# Patient Record
Sex: MALE | Race: WHITE | NOT HISPANIC OR LATINO
[De-identification: names, ages, dates, MRNs, and addresses within clinical notes are randomized per-mention and may not be internally consistent; named-entity substitution may affect disease eponyms.]

---

## 2017-03-10 ENCOUNTER — APPOINTMENT (OUTPATIENT)
Dept: HEMATOLOGY ONCOLOGY | Facility: CLINIC | Age: 67
End: 2017-03-10

## 2017-03-10 VITALS
TEMPERATURE: 97.5 F | HEIGHT: 71 IN | RESPIRATION RATE: 14 BRPM | BODY MASS INDEX: 35.56 KG/M2 | WEIGHT: 254 LBS | DIASTOLIC BLOOD PRESSURE: 85 MMHG | HEART RATE: 79 BPM | SYSTOLIC BLOOD PRESSURE: 111 MMHG

## 2017-03-10 LAB
BASOPHILS # BLD: 0.06 TH/MM3
BASOPHILS NFR BLD: 0.8 %
EOSINOPHIL # BLD: 0.15 TH/MM3
EOSINOPHIL NFR BLD: 1.9 %
ERYTHROCYTE [DISTWIDTH] IN BLOOD BY AUTOMATED COUNT: 12.9 %
GRANULOCYTES # BLD: 3.63 TH/MM3
GRANULOCYTES NFR BLD: 46.8 %
HCT VFR BLD AUTO: 45.9 %
HGB BLD-MCNC: 17.2 G/DL
IMM GRANULOCYTES # BLD: 0.04 TH/MM3
IMM GRANULOCYTES NFR BLD: 0.5 %
LYMPHOCYTES # BLD: 2.9 TH/MM3
LYMPHOCYTES NFR BLD: 37.3 %
MCH RBC QN AUTO: 32.6 PG
MCHC RBC AUTO-ENTMCNC: 37.5 G/DL
MCV RBC AUTO: 87.1 FL
MONOCYTES # BLD: 0.99 TH/MM3
MONOCYTES NFR BLD: 12.7 %
PLATELET # BLD: 70 TH/MM3
PMV BLD AUTO: 10.1 FL
RBC # BLD AUTO: 5.27 MIL/MM3
WBC # BLD: 7.77 TH/MM3

## 2017-04-05 ENCOUNTER — APPOINTMENT (OUTPATIENT)
Dept: HEMATOLOGY ONCOLOGY | Facility: CLINIC | Age: 67
End: 2017-04-05

## 2017-04-05 LAB
BASOPHILS # BLD: 0.06 TH/MM3
BASOPHILS NFR BLD: 0.8 %
EOSINOPHIL # BLD: 0.13 TH/MM3
EOSINOPHIL NFR BLD: 1.8 %
ERYTHROCYTE [DISTWIDTH] IN BLOOD BY AUTOMATED COUNT: 12.7 %
GRANULOCYTES # BLD: 3.78 TH/MM3
GRANULOCYTES NFR BLD: 53.2 %
HCT VFR BLD AUTO: 44.8 %
HGB BLD-MCNC: 16.1 G/DL
IMM GRANULOCYTES # BLD: 0.03 TH/MM3
IMM GRANULOCYTES NFR BLD: 0.4 %
LYMPHOCYTES # BLD: 2.53 TH/MM3
LYMPHOCYTES NFR BLD: 35.6 %
MCH RBC QN AUTO: 31.6 PG
MCHC RBC AUTO-ENTMCNC: 35.9 G/DL
MCV RBC AUTO: 88 FL
MONOCYTES # BLD: 0.58 TH/MM3
MONOCYTES NFR BLD: 8.2 %
PLATELET # BLD: 128 TH/MM3
PMV BLD AUTO: 10.6 FL
RBC # BLD AUTO: 5.09 MIL/MM3
WBC # BLD: 7.11 TH/MM3

## 2017-04-14 ENCOUNTER — APPOINTMENT (OUTPATIENT)
Dept: HEMATOLOGY ONCOLOGY | Facility: CLINIC | Age: 67
End: 2017-04-14

## 2017-04-14 VITALS
SYSTOLIC BLOOD PRESSURE: 128 MMHG | HEIGHT: 71 IN | BODY MASS INDEX: 35.84 KG/M2 | DIASTOLIC BLOOD PRESSURE: 74 MMHG | RESPIRATION RATE: 14 BRPM | WEIGHT: 256 LBS | TEMPERATURE: 96.9 F | HEART RATE: 72 BPM

## 2017-04-14 LAB
BASOPHILS # BLD: 0.05 TH/MM3
BASOPHILS NFR BLD: 0.6 %
EOSINOPHIL # BLD: 0.21 TH/MM3
EOSINOPHIL NFR BLD: 2.4 %
ERYTHROCYTE [DISTWIDTH] IN BLOOD BY AUTOMATED COUNT: 12.9 %
GRANULOCYTES # BLD: 4.11 TH/MM3
GRANULOCYTES NFR BLD: 47.7 %
HCT VFR BLD AUTO: 45.8 %
HGB BLD-MCNC: 16.4 G/DL
IMM GRANULOCYTES # BLD: 0.05 TH/MM3
IMM GRANULOCYTES NFR BLD: 0.6 %
LYMPHOCYTES # BLD: 3.28 TH/MM3
LYMPHOCYTES NFR BLD: 38.1 %
MCH RBC QN AUTO: 31.8 PG
MCHC RBC AUTO-ENTMCNC: 35.8 G/DL
MCV RBC AUTO: 88.8 FL
MONOCYTES # BLD: 0.91 TH/MM3
MONOCYTES NFR BLD: 10.6 %
PLATELET # BLD: 83 TH/MM3
PMV BLD AUTO: 10.9 FL
RBC # BLD AUTO: 5.16 MIL/MM3
WBC # BLD: 8.61 TH/MM3

## 2017-05-16 ENCOUNTER — APPOINTMENT (OUTPATIENT)
Dept: HEMATOLOGY ONCOLOGY | Facility: CLINIC | Age: 67
End: 2017-05-16

## 2017-05-16 LAB
BASOPHILS # BLD: 0.07 TH/MM3
BASOPHILS NFR BLD: 0.9 %
EOSINOPHIL # BLD: 0.13 TH/MM3
EOSINOPHIL NFR BLD: 1.7 %
ERYTHROCYTE [DISTWIDTH] IN BLOOD BY AUTOMATED COUNT: 12.6 %
GRANULOCYTES # BLD: 3.39 TH/MM3
GRANULOCYTES NFR BLD: 43.9 %
HCT VFR BLD AUTO: 46.8 %
HGB BLD-MCNC: 16.8 G/DL
IMM GRANULOCYTES # BLD: 0.05 TH/MM3
IMM GRANULOCYTES NFR BLD: 0.6 %
LYMPHOCYTES # BLD: 3.03 TH/MM3
LYMPHOCYTES NFR BLD: 39.2 %
MCH RBC QN AUTO: 31.5 PG
MCHC RBC AUTO-ENTMCNC: 35.9 G/DL
MCV RBC AUTO: 87.6 FL
MONOCYTES # BLD: 1.06 TH/MM3
MONOCYTES NFR BLD: 13.7 %
PLATELET # BLD: 139 TH/MM3
PMV BLD AUTO: 10.3 FL
RBC # BLD AUTO: 5.34 MIL/MM3
WBC # BLD: 7.73 TH/MM3

## 2017-06-09 ENCOUNTER — APPOINTMENT (OUTPATIENT)
Dept: HEMATOLOGY ONCOLOGY | Facility: CLINIC | Age: 67
End: 2017-06-09

## 2017-06-09 VITALS
SYSTOLIC BLOOD PRESSURE: 114 MMHG | DIASTOLIC BLOOD PRESSURE: 72 MMHG | RESPIRATION RATE: 14 BRPM | TEMPERATURE: 97.8 F | WEIGHT: 250 LBS | HEIGHT: 71 IN | BODY MASS INDEX: 35 KG/M2 | HEART RATE: 85 BPM

## 2017-06-13 LAB
BASOPHILS # BLD: 0.02 TH/MM3
BASOPHILS NFR BLD: 0.3 %
EOSINOPHIL # BLD: 0.13 TH/MM3
EOSINOPHIL NFR BLD: 1.7 %
ERYTHROCYTE [DISTWIDTH] IN BLOOD BY AUTOMATED COUNT: 12.9 %
GRANULOCYTES # BLD: 4.34 TH/MM3
GRANULOCYTES NFR BLD: 55.3 %
HCT VFR BLD AUTO: 44.4 %
HGB BLD-MCNC: 16.1 G/DL
IMM GRANULOCYTES # BLD: 0.04 TH/MM3
IMM GRANULOCYTES NFR BLD: 0.5 %
LYMPHOCYTES # BLD: 2.53 TH/MM3
LYMPHOCYTES NFR BLD: 32.3 %
MCH RBC QN AUTO: 31.8 PG
MCHC RBC AUTO-ENTMCNC: 36.3 G/DL
MCV RBC AUTO: 87.6 FL
MONOCYTES # BLD: 0.78 TH/MM3
MONOCYTES NFR BLD: 9.9 %
PLATELET # BLD: 146 TH/MM3
PMV BLD AUTO: 10.1 FL
RBC # BLD AUTO: 5.07 MIL/MM3
WBC # BLD: 7.84 TH/MM3

## 2017-08-04 ENCOUNTER — OUTPATIENT (OUTPATIENT)
Dept: OUTPATIENT SERVICES | Facility: HOSPITAL | Age: 67
LOS: 1 days | Discharge: HOME | End: 2017-08-04

## 2017-08-04 ENCOUNTER — APPOINTMENT (OUTPATIENT)
Dept: HEMATOLOGY ONCOLOGY | Facility: CLINIC | Age: 67
End: 2017-08-04

## 2017-08-04 VITALS
DIASTOLIC BLOOD PRESSURE: 83 MMHG | BODY MASS INDEX: 35.56 KG/M2 | HEART RATE: 76 BPM | WEIGHT: 254 LBS | SYSTOLIC BLOOD PRESSURE: 121 MMHG | HEIGHT: 71 IN | TEMPERATURE: 97.9 F

## 2017-08-04 DIAGNOSIS — D69.6 THROMBOCYTOPENIA, UNSPECIFIED: ICD-10-CM

## 2017-08-04 DIAGNOSIS — D69.3 IMMUNE THROMBOCYTOPENIC PURPURA: ICD-10-CM

## 2017-08-04 LAB
BASOPHILS # BLD: 0.05 TH/MM3
BASOPHILS NFR BLD: 0.6 %
EOSINOPHIL # BLD: 0.12 TH/MM3
EOSINOPHIL NFR BLD: 1.5 %
ERYTHROCYTE [DISTWIDTH] IN BLOOD BY AUTOMATED COUNT: 12.9 %
GRANULOCYTES # BLD: 4.55 TH/MM3
GRANULOCYTES NFR BLD: 56.4 %
HCT VFR BLD AUTO: 45.8 %
HGB BLD-MCNC: 16.5 G/DL
IMM GRANULOCYTES # BLD: 0.08 TH/MM3
IMM GRANULOCYTES NFR BLD: 1 %
LYMPHOCYTES # BLD: 2.25 TH/MM3
LYMPHOCYTES NFR BLD: 27.8 %
MCH RBC QN AUTO: 31.3 PG
MCHC RBC AUTO-ENTMCNC: 36 G/DL
MCV RBC AUTO: 86.9 FL
MONOCYTES # BLD: 1.03 TH/MM3
MONOCYTES NFR BLD: 12.7 %
PLATELET # BLD: 111 TH/MM3
PMV BLD AUTO: 11.2 FL
RBC # BLD AUTO: 5.27 MIL/MM3
WBC # BLD: 8.08 TH/MM3

## 2017-09-19 ENCOUNTER — APPOINTMENT (OUTPATIENT)
Dept: HEMATOLOGY ONCOLOGY | Facility: CLINIC | Age: 67
End: 2017-09-19

## 2017-09-22 ENCOUNTER — APPOINTMENT (OUTPATIENT)
Dept: HEMATOLOGY ONCOLOGY | Facility: CLINIC | Age: 67
End: 2017-09-22

## 2017-09-22 ENCOUNTER — OUTPATIENT (OUTPATIENT)
Dept: OUTPATIENT SERVICES | Facility: HOSPITAL | Age: 67
LOS: 1 days | Discharge: HOME | End: 2017-09-22

## 2017-09-22 VITALS
BODY MASS INDEX: 35.42 KG/M2 | HEART RATE: 86 BPM | DIASTOLIC BLOOD PRESSURE: 70 MMHG | WEIGHT: 253 LBS | TEMPERATURE: 98 F | SYSTOLIC BLOOD PRESSURE: 121 MMHG | RESPIRATION RATE: 14 BRPM | HEIGHT: 71 IN

## 2017-09-22 DIAGNOSIS — R79.89 OTHER SPECIFIED ABNORMAL FINDINGS OF BLOOD CHEMISTRY: ICD-10-CM

## 2017-09-22 LAB
BASOPHILS # BLD: 0.02 TH/MM3
BASOPHILS NFR BLD: 0.4 %
EOSINOPHIL # BLD: 0.1 TH/MM3
EOSINOPHIL NFR BLD: 1.8 %
ERYTHROCYTE [DISTWIDTH] IN BLOOD BY AUTOMATED COUNT: 13 %
FERRITIN SERPL-MCNC: 641 NG/ML
GRANULOCYTES # BLD: 2.68 TH/MM3
GRANULOCYTES NFR BLD: 48.6 %
HCT VFR BLD AUTO: 45.7 %
HGB BLD-MCNC: 16.3 G/DL
IMM GRANULOCYTES # BLD: 0.02 TH/MM3
IMM GRANULOCYTES NFR BLD: 0.4 %
IRON SERPL-MCNC: 90 UG/DL
LYMPHOCYTES # BLD: 2.16 TH/MM3
LYMPHOCYTES NFR BLD: 39.2 %
MCH RBC QN AUTO: 31.5 PG
MCHC RBC AUTO-ENTMCNC: 35.7 G/DL
MCV RBC AUTO: 88.4 FL
MONOCYTES # BLD: 0.53 TH/MM3
MONOCYTES NFR BLD: 9.6 %
PERCENT SATURATION (NORTH): 29.3 %
PLATELET # BLD: 206 TH/MM3
PMV BLD AUTO: 8.9 FL
RBC # BLD AUTO: 5.17 MIL/MM3
WBC # BLD: 5.51 TH/MM3

## 2017-09-23 ENCOUNTER — OUTPATIENT (OUTPATIENT)
Dept: OUTPATIENT SERVICES | Facility: HOSPITAL | Age: 67
LOS: 1 days | Discharge: HOME | End: 2017-09-23

## 2017-09-23 DIAGNOSIS — R79.89 OTHER SPECIFIED ABNORMAL FINDINGS OF BLOOD CHEMISTRY: ICD-10-CM

## 2017-09-26 DIAGNOSIS — D69.3 IMMUNE THROMBOCYTOPENIC PURPURA: ICD-10-CM

## 2017-09-26 DIAGNOSIS — R79.89 OTHER SPECIFIED ABNORMAL FINDINGS OF BLOOD CHEMISTRY: ICD-10-CM

## 2017-11-12 ENCOUNTER — EMERGENCY (EMERGENCY)
Facility: HOSPITAL | Age: 67
LOS: 0 days | Discharge: HOME | End: 2017-11-12
Admitting: FAMILY MEDICINE

## 2017-11-12 DIAGNOSIS — Z91.040 LATEX ALLERGY STATUS: ICD-10-CM

## 2017-11-12 DIAGNOSIS — M54.5 LOW BACK PAIN: ICD-10-CM
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
## 2017-12-20 ENCOUNTER — APPOINTMENT (OUTPATIENT)
Dept: HEMATOLOGY ONCOLOGY | Facility: CLINIC | Age: 67
End: 2017-12-20

## 2017-12-20 ENCOUNTER — OUTPATIENT (OUTPATIENT)
Dept: OUTPATIENT SERVICES | Facility: HOSPITAL | Age: 67
LOS: 1 days | Discharge: HOME | End: 2017-12-20

## 2017-12-20 VITALS
HEART RATE: 88 BPM | BODY MASS INDEX: 32.2 KG/M2 | DIASTOLIC BLOOD PRESSURE: 76 MMHG | WEIGHT: 230 LBS | RESPIRATION RATE: 14 BRPM | SYSTOLIC BLOOD PRESSURE: 146 MMHG | TEMPERATURE: 97.3 F | HEIGHT: 71 IN

## 2017-12-21 DIAGNOSIS — D69.6 THROMBOCYTOPENIA, UNSPECIFIED: ICD-10-CM

## 2017-12-21 LAB
BASOPHILS # BLD: 0.05 TH/MM3
BASOPHILS NFR BLD: 0.7 %
EOSINOPHIL # BLD: 0.15 TH/MM3
EOSINOPHIL NFR BLD: 2 %
ERYTHROCYTE [DISTWIDTH] IN BLOOD BY AUTOMATED COUNT: 12.6 %
GRANULOCYTES # BLD: 3.85 TH/MM3
GRANULOCYTES NFR BLD: 51.2 %
HCT VFR BLD AUTO: 46 %
HGB BLD-MCNC: 16.5 G/DL
IMM GRANULOCYTES # BLD: 0.09 TH/MM3
IMM GRANULOCYTES NFR BLD: 1.2 %
LYMPHOCYTES # BLD: 2.54 TH/MM3
LYMPHOCYTES NFR BLD: 33.8 %
MCH RBC QN AUTO: 31.7 PG
MCHC RBC AUTO-ENTMCNC: 35.9 G/DL
MCV RBC AUTO: 88.5 FL
MONOCYTES # BLD: 0.83 TH/MM3
MONOCYTES NFR BLD: 11.1 %
PLATELET # BLD: 125 TH/MM3
PMV BLD AUTO: 10.7 FL
RBC # BLD AUTO: 5.2 MIL/MM3
WBC # BLD: 7.51 TH/MM3

## 2018-01-14 ENCOUNTER — TRANSCRIPTION ENCOUNTER (OUTPATIENT)
Age: 68
End: 2018-01-14

## 2018-03-20 ENCOUNTER — LABORATORY RESULT (OUTPATIENT)
Age: 68
End: 2018-03-20

## 2018-03-20 ENCOUNTER — APPOINTMENT (OUTPATIENT)
Dept: HEMATOLOGY ONCOLOGY | Facility: CLINIC | Age: 68
End: 2018-03-20

## 2018-03-20 VITALS
DIASTOLIC BLOOD PRESSURE: 68 MMHG | TEMPERATURE: 97.5 F | RESPIRATION RATE: 14 BRPM | WEIGHT: 234 LBS | BODY MASS INDEX: 32.76 KG/M2 | HEIGHT: 71 IN | SYSTOLIC BLOOD PRESSURE: 117 MMHG | HEART RATE: 65 BPM

## 2018-03-26 LAB
HCT VFR BLD CALC: 44.6 %
HGB BLD-MCNC: 15.6 G/DL
MCHC RBC-ENTMCNC: 31.5 PG
MCHC RBC-ENTMCNC: 35 G/DL
MCV RBC AUTO: 90.1 FL
PLATELET # BLD AUTO: 116 K/UL
PMV BLD: 10.5 FL
RBC # BLD: 4.95 M/UL
RBC # FLD: 12.2 %
WBC # FLD AUTO: 5.95 K/UL

## 2018-04-25 ENCOUNTER — LABORATORY RESULT (OUTPATIENT)
Age: 68
End: 2018-04-25

## 2018-04-25 ENCOUNTER — APPOINTMENT (OUTPATIENT)
Dept: HEMATOLOGY ONCOLOGY | Facility: CLINIC | Age: 68
End: 2018-04-25

## 2018-04-26 LAB
HCT VFR BLD CALC: 44.7 %
HGB BLD-MCNC: 15.7 G/DL
MCHC RBC-ENTMCNC: 31.2 PG
MCHC RBC-ENTMCNC: 35.1 G/DL
MCV RBC AUTO: 88.9 FL
PLATELET # BLD AUTO: 53 K/UL
PMV BLD: 11.6 FL
RBC # BLD: 5.03 M/UL
RBC # FLD: 12.2 %
WBC # FLD AUTO: 6.65 K/UL

## 2018-05-03 ENCOUNTER — LABORATORY RESULT (OUTPATIENT)
Age: 68
End: 2018-05-03

## 2018-05-03 ENCOUNTER — APPOINTMENT (OUTPATIENT)
Dept: HEMATOLOGY ONCOLOGY | Facility: CLINIC | Age: 68
End: 2018-05-03

## 2018-05-07 LAB
HCT VFR BLD CALC: 46.3 %
HGB BLD-MCNC: 16.4 G/DL
MCHC RBC-ENTMCNC: 31.3 PG
MCHC RBC-ENTMCNC: 35.4 G/DL
MCV RBC AUTO: 88.4 FL
PLATELET # BLD AUTO: 102 K/UL
PMV BLD: 10.8 FL
RBC # BLD: 5.24 M/UL
RBC # FLD: 12.2 %
WBC # FLD AUTO: 6.48 K/UL

## 2018-05-22 ENCOUNTER — APPOINTMENT (OUTPATIENT)
Dept: HEMATOLOGY ONCOLOGY | Facility: CLINIC | Age: 68
End: 2018-05-22

## 2018-05-22 ENCOUNTER — LABORATORY RESULT (OUTPATIENT)
Age: 68
End: 2018-05-22

## 2018-05-23 LAB
HCT VFR BLD CALC: 46.5 %
HGB BLD-MCNC: 16.5 G/DL
MCHC RBC-ENTMCNC: 31.5 PG
MCHC RBC-ENTMCNC: 35.5 G/DL
MCV RBC AUTO: 88.9 FL
PLATELET # BLD AUTO: 55 K/UL
PMV BLD: 12.2 FL
RBC # BLD: 5.23 M/UL
RBC # FLD: 12.2 %
WBC # FLD AUTO: 6.22 K/UL

## 2018-06-19 ENCOUNTER — APPOINTMENT (OUTPATIENT)
Dept: HEMATOLOGY ONCOLOGY | Facility: CLINIC | Age: 68
End: 2018-06-19

## 2018-06-19 ENCOUNTER — LABORATORY RESULT (OUTPATIENT)
Age: 68
End: 2018-06-19

## 2018-06-19 ENCOUNTER — OUTPATIENT (OUTPATIENT)
Dept: OUTPATIENT SERVICES | Facility: HOSPITAL | Age: 68
LOS: 1 days | Discharge: HOME | End: 2018-06-19

## 2018-06-19 VITALS
WEIGHT: 231 LBS | DIASTOLIC BLOOD PRESSURE: 74 MMHG | HEIGHT: 71 IN | TEMPERATURE: 96 F | SYSTOLIC BLOOD PRESSURE: 117 MMHG | HEART RATE: 63 BPM | BODY MASS INDEX: 32.34 KG/M2

## 2018-06-19 DIAGNOSIS — D69.6 THROMBOCYTOPENIA, UNSPECIFIED: ICD-10-CM

## 2018-06-25 LAB
HCT VFR BLD CALC: 46.1 %
HGB BLD-MCNC: 16.3 G/DL
MCHC RBC-ENTMCNC: 31.5 PG
MCHC RBC-ENTMCNC: 35.4 G/DL
MCV RBC AUTO: 89 FL
PLATELET # BLD AUTO: 52 K/UL
PMV BLD: 11.2 FL
RBC # BLD: 5.18 M/UL
RBC # FLD: 12.3 %
WBC # FLD AUTO: 5.75 K/UL

## 2018-07-19 ENCOUNTER — LABORATORY RESULT (OUTPATIENT)
Age: 68
End: 2018-07-19

## 2018-07-19 ENCOUNTER — APPOINTMENT (OUTPATIENT)
Dept: HEMATOLOGY ONCOLOGY | Facility: CLINIC | Age: 68
End: 2018-07-19

## 2018-07-25 ENCOUNTER — APPOINTMENT (OUTPATIENT)
Dept: HEMATOLOGY ONCOLOGY | Facility: CLINIC | Age: 68
End: 2018-07-25

## 2018-07-25 ENCOUNTER — LABORATORY RESULT (OUTPATIENT)
Age: 68
End: 2018-07-25

## 2018-07-25 VITALS
RESPIRATION RATE: 14 BRPM | WEIGHT: 232 LBS | BODY MASS INDEX: 32.48 KG/M2 | TEMPERATURE: 96.4 F | SYSTOLIC BLOOD PRESSURE: 115 MMHG | HEART RATE: 68 BPM | DIASTOLIC BLOOD PRESSURE: 85 MMHG | HEIGHT: 71 IN

## 2018-07-26 LAB
ALBUMIN SERPL ELPH-MCNC: 4.4 G/DL
ALP BLD-CCNC: 55 U/L
ALT SERPL-CCNC: 26 U/L
ANION GAP SERPL CALC-SCNC: 14 MMOL/L
AST SERPL-CCNC: 22 U/L
BILIRUB SERPL-MCNC: 0.7 MG/DL
BUN SERPL-MCNC: 13 MG/DL
CALCIUM SERPL-MCNC: 8.9 MG/DL
CHLORIDE SERPL-SCNC: 99 MMOL/L
CO2 SERPL-SCNC: 24 MMOL/L
CREAT SERPL-MCNC: 1.1 MG/DL
FOLATE RBC-MCNC: 1004 NG/ML
GLUCOSE SERPL-MCNC: 105 MG/DL
HCT VFR BLD CALC: 48 %
HCT VFR BLD CALC: 48.5 %
HCT VFR BLD CALC: 49 %
HGB BLD-MCNC: 16.9 G/DL
HGB BLD-MCNC: 17.2 G/DL
MCHC RBC-ENTMCNC: 31.5 PG
MCHC RBC-ENTMCNC: 31.6 PG
MCHC RBC-ENTMCNC: 35.2 G/DL
MCHC RBC-ENTMCNC: 35.5 G/DL
MCV RBC AUTO: 89 FL
MCV RBC AUTO: 89.4 FL
PLATELET # BLD AUTO: 144 K/UL
PLATELET # BLD AUTO: 54 K/UL
PMV BLD: 10.2 FL
PMV BLD: 11.5 FL
POTASSIUM SERPL-SCNC: 4.2 MMOL/L
PROT SERPL-MCNC: 7.2 G/DL
RBC # BLD: 5.37 M/UL
RBC # BLD: 5.45 M/UL
RBC # FLD: 12.4 %
RBC # FLD: 12.4 %
SODIUM SERPL-SCNC: 137 MMOL/L
VIT B12 SERPL-MCNC: 489 PG/ML
WBC # FLD AUTO: 6.94 K/UL
WBC # FLD AUTO: 8.08 K/UL

## 2018-08-15 ENCOUNTER — APPOINTMENT (OUTPATIENT)
Dept: HEMATOLOGY ONCOLOGY | Facility: CLINIC | Age: 68
End: 2018-08-15

## 2018-08-15 ENCOUNTER — LABORATORY RESULT (OUTPATIENT)
Age: 68
End: 2018-08-15

## 2018-08-16 ENCOUNTER — TRANSCRIPTION ENCOUNTER (OUTPATIENT)
Age: 68
End: 2018-08-16

## 2018-08-16 LAB
HCT VFR BLD CALC: 46.7 %
HGB BLD-MCNC: 16.6 G/DL
MCHC RBC-ENTMCNC: 31.5 PG
MCHC RBC-ENTMCNC: 35.5 G/DL
MCV RBC AUTO: 88.6 FL
PLATELET # BLD AUTO: 71 K/UL
PMV BLD: 10.7 FL
RBC # BLD: 5.27 M/UL
RBC # FLD: 12.3 %
WBC # FLD AUTO: 7.31 K/UL

## 2018-08-22 ENCOUNTER — TRANSCRIPTION ENCOUNTER (OUTPATIENT)
Age: 68
End: 2018-08-22

## 2018-09-12 ENCOUNTER — APPOINTMENT (OUTPATIENT)
Dept: HEMATOLOGY ONCOLOGY | Facility: CLINIC | Age: 68
End: 2018-09-12

## 2018-09-12 ENCOUNTER — LABORATORY RESULT (OUTPATIENT)
Age: 68
End: 2018-09-12

## 2018-09-13 LAB
HCT VFR BLD CALC: 46.3 %
HGB BLD-MCNC: 16.5 G/DL
MCHC RBC-ENTMCNC: 31.4 PG
MCHC RBC-ENTMCNC: 35.6 G/DL
MCV RBC AUTO: 88 FL
PLATELET # BLD AUTO: 73 K/UL
PMV BLD: 11.4 FL
RBC # BLD: 5.26 M/UL
RBC # FLD: 12.6 %
WBC # FLD AUTO: 5.99 K/UL

## 2018-10-17 ENCOUNTER — LABORATORY RESULT (OUTPATIENT)
Age: 68
End: 2018-10-17

## 2018-10-17 ENCOUNTER — APPOINTMENT (OUTPATIENT)
Dept: HEMATOLOGY ONCOLOGY | Facility: CLINIC | Age: 68
End: 2018-10-17

## 2018-10-17 VITALS
WEIGHT: 233 LBS | SYSTOLIC BLOOD PRESSURE: 118 MMHG | TEMPERATURE: 97.5 F | RESPIRATION RATE: 14 BRPM | HEART RATE: 73 BPM | BODY MASS INDEX: 45.75 KG/M2 | HEIGHT: 60 IN | DIASTOLIC BLOOD PRESSURE: 80 MMHG

## 2018-10-18 LAB
HCT VFR BLD CALC: 44.1 %
HGB BLD-MCNC: 15.9 G/DL
MCHC RBC-ENTMCNC: 32.1 PG
MCHC RBC-ENTMCNC: 36.1 G/DL
MCV RBC AUTO: 89.1 FL
PLATELET # BLD AUTO: 103 K/UL
PMV BLD: 10.9 FL
RBC # BLD: 4.95 M/UL
RBC # FLD: 12.6 %
WBC # FLD AUTO: 7.46 K/UL

## 2018-11-01 ENCOUNTER — LABORATORY RESULT (OUTPATIENT)
Age: 68
End: 2018-11-01

## 2018-11-01 ENCOUNTER — OUTPATIENT (OUTPATIENT)
Dept: OUTPATIENT SERVICES | Facility: HOSPITAL | Age: 68
LOS: 1 days | Discharge: HOME | End: 2018-11-01

## 2018-11-01 ENCOUNTER — APPOINTMENT (OUTPATIENT)
Dept: HEMATOLOGY ONCOLOGY | Facility: CLINIC | Age: 68
End: 2018-11-01

## 2018-11-01 VITALS
TEMPERATURE: 97.2 F | RESPIRATION RATE: 14 BRPM | HEIGHT: 69 IN | DIASTOLIC BLOOD PRESSURE: 78 MMHG | HEART RATE: 70 BPM | SYSTOLIC BLOOD PRESSURE: 126 MMHG | BODY MASS INDEX: 35.1 KG/M2 | WEIGHT: 237 LBS

## 2018-11-01 DIAGNOSIS — D69.6 THROMBOCYTOPENIA, UNSPECIFIED: ICD-10-CM

## 2018-11-01 DIAGNOSIS — D69.3 IMMUNE THROMBOCYTOPENIC PURPURA: ICD-10-CM

## 2018-11-02 LAB
HCT VFR BLD CALC: 44.3 %
HGB BLD-MCNC: 15.6 G/DL
MCHC RBC-ENTMCNC: 31.6 PG
MCHC RBC-ENTMCNC: 35.2 G/DL
MCV RBC AUTO: 89.7 FL
PLATELET # BLD AUTO: 86 K/UL
PMV BLD: 11.5 FL
RBC # BLD: 4.94 M/UL
RBC # FLD: 12.8 %
WBC # FLD AUTO: 6.2 K/UL

## 2018-11-08 ENCOUNTER — OUTPATIENT (OUTPATIENT)
Dept: OUTPATIENT SERVICES | Facility: HOSPITAL | Age: 68
LOS: 1 days | Discharge: HOME | End: 2018-11-08

## 2018-11-08 DIAGNOSIS — D69.3 IMMUNE THROMBOCYTOPENIC PURPURA: ICD-10-CM

## 2018-11-19 NOTE — PHYSICAL EXAM
[Fully active, able to carry on all pre-disease performance without restriction] : Status 0 - Fully active, able to carry on all pre-disease performance without restriction [Normal] : no peripheral adenopathy appreciated [de-identified] : soft, non tender

## 2018-11-19 NOTE — PHYSICAL EXAM
[Fully active, able to carry on all pre-disease performance without restriction] : Status 0 - Fully active, able to carry on all pre-disease performance without restriction [Normal] : no JVD, no calf tenderness, venous stasis changes, varices [de-identified] : + palpable lymph node superior to right clavicle

## 2018-11-19 NOTE — REVIEW OF SYSTEMS
[Swollen Glands] : swollen glands [Negative] : Integumentary [de-identified] : + LAD superior to right clavicle

## 2018-11-19 NOTE — ASSESSMENT
[FreeTextEntry1] : ITP on observation \par --Platelet counts were reviewed with patient, will plan to repeat CBCs once a month\par --Signs and symptoms of worsening platelet count were discussed today\par --Patient knows to call if any signs or symptoms of thrombocytopenia occur before follow up appointment\par --H/o MBx in the past\par \par High ferritin: likely reactive\par -- US of liver and spleen, patient has h/o fatty liver\par \par Prostate cancer s/p radical prostatectomy in 2007\par --Follows with urology \par \par Family h/o colon cancer \par --Advised to schedule follow up colonoscopy ASAP \par \par Follow up in 1 months with CBC\par

## 2018-11-19 NOTE — HISTORY OF PRESENT ILLNESS
[de-identified] : 66 yo male, former patient of Dr. Dorsey, presents for followup for idiopathic thrombocytopenic purpura, initially diagnosed in 2012. He has not required treatment and remains on observation. He has no complaints today. Denies bleeding. He had a bone marrow biopsy in the past, which per patient report was noted to be negative. \par He has never had a trial of steroids. \par Additionally he was noted to have persistently elevated ferritin (600 range). He reports genetic testing for hemochromatosis was performed by Dr. Dorsey and was noted to be negative. \par He has h/o prostate cancer, he is s/p radical prostatectomy in 2007, he follows with urology Dr. Maico Estrella. \par His has family history positive for colon cancer in his brother diagnosed in his early 50s. Toy is not aware of any genetic predisposition to colon cancer in his family. He is dues for his colonoscopy and was advised to make an appointment with them. his prior colonoscopy revealed polyps. \par \par \par  [de-identified] : 9/22/2017: Denies any complaints. No fever, nausea, vomiting, chest pain, abdominal pain, bowel and bladder problems. \par \par 12/20/201: No complaints today.\par \par 3/20/2018: Platelet count is slightly worse, he remains asymptomatic. He is now taking Curcuma. He was educated about signs of worsening thrombocytopenia. \par \par 5/19/018: No complaints, denies any bleeding or bruising issues. Platelets relatively stable. \par \par 7/25/2018: No complaints. no bleeding symptoms. Platelets counts were reviewed with patient.

## 2018-11-19 NOTE — HISTORY OF PRESENT ILLNESS
[de-identified] : 66 yo male, former patient of Dr. Dorsey, presents for followup for idiopathic thrombocytopenic purpura, initially diagnosed in 2012. He has not required treatment and remains on observation. He has no complaints today. Denies bleeding. He had a bone marrow biopsy in the past, which per patient report was noted to be negative. \par He has never had a trial of steroids. \par Additionally he was noted to have persistently elevated ferritin (600 range). He reports genetic testing for hemochromatosis was performed by Dr. Dorsey and was noted to be negative. \par He has h/o prostate cancer, he is s/p radical prostatectomy in 2007, he follows with urology Dr. Maico Estrella. \par His has family history positive for colon cancer in his brother diagnosed in his early 50s. Toy is not aware of any genetic predisposition to colon cancer in his family. He is due for his colonoscopy and was advised to make an appointment with them. his prior colonoscopy revealed polyps. \par \par \par  [de-identified] : 9/22/2017: Denies any complaints. No fever, nausea, vomiting, chest pain, abdominal pain, bowel and bladder problems. \par \par 12/20/201: No complaints today.\par \par 3/20/2018: Platelet count is slightly worse, he remains asymptomatic. He is now taking Curcuma. He was educated about signs of worsening thrombocytopenia.\par \par 10/17/2018: Patient is relieved that his platelet count is 103.  Needs to schedule an EGD/colonoscopy, but his gastroenterologist, Dr. Herrmann, is out on medical leave.  He is waiting to wait until he returns to work.  Also complains that he has noticed that his right "clavicle is swollen".  He needs to schedule a dental implant and is worried about how his ITP may affect the procedure.\par \par 11/1/2018: At this point Anne is not sure he will pursue the implant, he will keep me informed re this. Platelet count has been fluctuating 2/2 antibiotic use. Supraclavicular fullness has been persistent, we will proceed with ordering an US at this point. No other complaints today.

## 2018-11-19 NOTE — ASSESSMENT
[FreeTextEntry1] : ITP \par --Has never had a trial of stroids\par --H/o BMBx in the past\par --Patient is concerned about ITP affecting needed dental implant surgery. He may not proceed with dental implant at this point he will keep me informed \par --R supraclavicular LAD-? reactive secondary to dental infection. Will return to clinic in 2-3 weeks. The node has not decreased or resolved, will order ultrasound on 11/1/2018\par \par Prostate cancer, in remission\par --patient follows up with urology yearly\par \par HCM\par -Patient will schedule EGD/colonoscopy once his gastroenterologist returns from medical leave.\par \par RTC in 1 month

## 2019-02-07 ENCOUNTER — LABORATORY RESULT (OUTPATIENT)
Age: 69
End: 2019-02-07

## 2019-02-07 ENCOUNTER — APPOINTMENT (OUTPATIENT)
Dept: HEMATOLOGY ONCOLOGY | Facility: CLINIC | Age: 69
End: 2019-02-07

## 2019-02-07 VITALS
DIASTOLIC BLOOD PRESSURE: 75 MMHG | WEIGHT: 240 LBS | HEART RATE: 72 BPM | RESPIRATION RATE: 14 BRPM | TEMPERATURE: 97.6 F | SYSTOLIC BLOOD PRESSURE: 131 MMHG | HEIGHT: 69 IN | BODY MASS INDEX: 35.55 KG/M2

## 2019-02-08 LAB
HCT VFR BLD CALC: 46.3 %
HGB BLD-MCNC: 16.5 G/DL
MCHC RBC-ENTMCNC: 31.7 PG
MCHC RBC-ENTMCNC: 35.6 G/DL
MCV RBC AUTO: 88.9 FL
PLATELET # BLD AUTO: 66 K/UL
PMV BLD: 11.3 FL
RBC # BLD: 5.21 M/UL
RBC # FLD: 12.1 %
WBC # FLD AUTO: 6.15 K/UL

## 2019-03-05 ENCOUNTER — LABORATORY RESULT (OUTPATIENT)
Age: 69
End: 2019-03-05

## 2019-03-05 ENCOUNTER — RESULT REVIEW (OUTPATIENT)
Age: 69
End: 2019-03-05

## 2019-03-05 ENCOUNTER — APPOINTMENT (OUTPATIENT)
Dept: HEMATOLOGY ONCOLOGY | Facility: CLINIC | Age: 69
End: 2019-03-05

## 2019-03-05 LAB
HCT VFR BLD CALC: 46.1 %
HGB BLD-MCNC: 16 G/DL
MCHC RBC-ENTMCNC: 31.4 PG
MCHC RBC-ENTMCNC: 34.7 G/DL
MCV RBC AUTO: 90.4 FL
PLATELET # BLD AUTO: 164 K/UL
PMV BLD: 9.3 FL
RBC # BLD: 5.1 M/UL
RBC # FLD: 12.5 %
WBC # FLD AUTO: 5.95 K/UL

## 2019-03-07 LAB
ALBUMIN SERPL ELPH-MCNC: 4.5 G/DL
ALP BLD-CCNC: 59 U/L
ALT SERPL-CCNC: 31 U/L
ANION GAP SERPL CALC-SCNC: 16 MMOL/L
AST SERPL-CCNC: 25 U/L
BILIRUB SERPL-MCNC: 0.6 MG/DL
BUN SERPL-MCNC: 17 MG/DL
CALCIUM SERPL-MCNC: 9.4 MG/DL
CHLORIDE SERPL-SCNC: 102 MMOL/L
CO2 SERPL-SCNC: 22 MMOL/L
CREAT SERPL-MCNC: 1.2 MG/DL
FERRITIN SERPL-MCNC: 591 NG/ML
FOLATE SERPL-MCNC: 11 NG/ML
GLUCOSE SERPL-MCNC: 140 MG/DL
IRON SATN MFR SERPL: 32 %
IRON SERPL-MCNC: 83 UG/DL
POTASSIUM SERPL-SCNC: 4.3 MMOL/L
PROT SERPL-MCNC: 7.3 G/DL
SODIUM SERPL-SCNC: 140 MMOL/L
TIBC SERPL-MCNC: 259 UG/DL
UIBC SERPL-MCNC: 176 UG/DL
VIT B12 SERPL-MCNC: 366 PG/ML

## 2019-05-06 NOTE — ASSESSMENT
[FreeTextEntry1] : ITP \par --Has never had a trial of stroids\par --H/o BMBx in the past\par --Patient is concerned about ITP affecting needed dental implant surgery. He may not proceed with dental implant at this point he will keep me informed \par --R supraclavicular LAD-? reactive secondary to dental infection. Will return to clinic in 2-3 weeks. The node has not decreased or resolved, US on 11/2018 revealed 2 morphologically normal LN in R supraclavicular area, this no longer present on physical exam \par \par Prostate cancer, in remission\par --patient follows up with urology yearly\par \par HCM\par -Patient will schedule EGD/colonoscopy once his gastroenterologist returns from medical leave.\par \par CBC in 1 month, follow up in 4 months or earlier if needed

## 2019-05-06 NOTE — REVIEW OF SYSTEMS
[Swollen Glands] : swollen glands [Negative] : Integumentary [de-identified] : + LAD superior to right clavicle

## 2019-05-06 NOTE — PHYSICAL EXAM
[Fully active, able to carry on all pre-disease performance without restriction] : Status 0 - Fully active, able to carry on all pre-disease performance without restriction [Normal] : no JVD, no calf tenderness, venous stasis changes, varices [de-identified] : + palpable lymph node superior to right clavicle

## 2019-05-06 NOTE — HISTORY OF PRESENT ILLNESS
[de-identified] : 68 yo male, former patient of Dr. Dorsey, presents for followup for idiopathic thrombocytopenic purpura, initially diagnosed in 2012. He has not required treatment and remains on observation. He has no complaints today. Denies bleeding. He had a bone marrow biopsy in the past, which per patient report was noted to be negative. \par He has never had a trial of steroids. \par Additionally he was noted to have persistently elevated ferritin (600 range). He reports genetic testing for hemochromatosis was performed by Dr. Dorsey and was noted to be negative. \par He has h/o prostate cancer, he is s/p radical prostatectomy in 2007, he follows with urology Dr. Maico Estrella. \par His has family history positive for colon cancer in his brother diagnosed in his early 50s. Toy is not aware of any genetic predisposition to colon cancer in his family. He is due for his colonoscopy and was advised to make an appointment with them. his prior colonoscopy revealed polyps. \par \par \par  [de-identified] : 9/22/2017: Denies any complaints. No fever, nausea, vomiting, chest pain, abdominal pain, bowel and bladder problems. \par \par 12/20/201: No complaints today.\par \par 3/20/2018: Platelet count is slightly worse, he remains asymptomatic. He is now taking Curcuma. He was educated about signs of worsening thrombocytopenia.\par \par 10/17/2018: Patient is relieved that his platelet count is 103.  Needs to schedule an EGD/colonoscopy, but his gastroenterologist, Dr. Herrmann, is out on medical leave.  He is waiting to wait until he returns to work.  Also complains that he has noticed that his right "clavicle is swollen".  He needs to schedule a dental implant and is worried about how his ITP may affect the procedure.\par \par 11/1/2018: At this point Anne is not sure he will pursue the implant, he will keep me informed re this. Platelet count has been fluctuating 2/2 antibiotic use. Supraclavicular fullness has been persistent, we will proceed with ordering an US at this point. No other complaints today. \par \par 2/7/2019: No complaints, platelets slightly lower today, reports no bleeding. Will plan to repeat in a month, patient was educated re bleeding symptoms.

## 2019-05-07 ENCOUNTER — APPOINTMENT (OUTPATIENT)
Dept: HEMATOLOGY ONCOLOGY | Facility: CLINIC | Age: 69
End: 2019-05-07

## 2019-05-07 ENCOUNTER — LABORATORY RESULT (OUTPATIENT)
Age: 69
End: 2019-05-07

## 2019-05-07 ENCOUNTER — OUTPATIENT (OUTPATIENT)
Dept: OUTPATIENT SERVICES | Facility: HOSPITAL | Age: 69
LOS: 1 days | Discharge: HOME | End: 2019-05-07

## 2019-05-07 VITALS
BODY MASS INDEX: 35.55 KG/M2 | HEIGHT: 69 IN | RESPIRATION RATE: 16 BRPM | SYSTOLIC BLOOD PRESSURE: 117 MMHG | TEMPERATURE: 96.9 F | DIASTOLIC BLOOD PRESSURE: 73 MMHG | HEART RATE: 64 BPM | WEIGHT: 240 LBS

## 2019-05-07 DIAGNOSIS — D69.6 THROMBOCYTOPENIA, UNSPECIFIED: ICD-10-CM

## 2019-05-07 LAB
HCT VFR BLD CALC: 47.3 %
HGB BLD-MCNC: 16.8 G/DL
MCHC RBC-ENTMCNC: 31.4 PG
MCHC RBC-ENTMCNC: 35.5 G/DL
MCV RBC AUTO: 88.4 FL
PLATELET # BLD AUTO: 108 K/UL
PMV BLD: 10.8 FL
RBC # BLD: 5.35 M/UL
RBC # FLD: 12.2 %
WBC # FLD AUTO: 6.45 K/UL

## 2019-05-17 ENCOUNTER — APPOINTMENT (OUTPATIENT)
Dept: HEMATOLOGY ONCOLOGY | Facility: CLINIC | Age: 69
End: 2019-05-17

## 2019-05-17 ENCOUNTER — LABORATORY RESULT (OUTPATIENT)
Age: 69
End: 2019-05-17

## 2019-05-17 LAB
HCT VFR BLD CALC: 46.6 %
HGB BLD-MCNC: 16.5 G/DL
MCHC RBC-ENTMCNC: 31.4 PG
MCHC RBC-ENTMCNC: 35.4 G/DL
MCV RBC AUTO: 88.6 FL
PLATELET # BLD AUTO: 101 K/UL
PMV BLD: 11.4 FL
RBC # BLD: 5.26 M/UL
RBC # FLD: 12.4 %
WBC # FLD AUTO: 6.27 K/UL

## 2019-05-23 ENCOUNTER — APPOINTMENT (OUTPATIENT)
Dept: HEMATOLOGY ONCOLOGY | Facility: CLINIC | Age: 69
End: 2019-05-23

## 2019-05-23 ENCOUNTER — LABORATORY RESULT (OUTPATIENT)
Age: 69
End: 2019-05-23

## 2019-05-23 LAB
HCT VFR BLD CALC: 45.9 %
HGB BLD-MCNC: 16.2 G/DL
MCHC RBC-ENTMCNC: 31.6 PG
MCHC RBC-ENTMCNC: 35.3 G/DL
MCV RBC AUTO: 89.6 FL
PLATELET # BLD AUTO: 89 K/UL
PMV BLD: 10.4 FL
RBC # BLD: 5.12 M/UL
RBC # FLD: 12 %
WBC # FLD AUTO: 6.46 K/UL

## 2019-05-28 ENCOUNTER — APPOINTMENT (OUTPATIENT)
Dept: HEMATOLOGY ONCOLOGY | Facility: CLINIC | Age: 69
End: 2019-05-28

## 2019-05-28 ENCOUNTER — LABORATORY RESULT (OUTPATIENT)
Age: 69
End: 2019-05-28

## 2019-05-28 LAB
HCT VFR BLD CALC: 45.1 %
HGB BLD-MCNC: 15.5 G/DL
MCHC RBC-ENTMCNC: 31.1 PG
MCHC RBC-ENTMCNC: 34.4 G/DL
MCV RBC AUTO: 90.6 FL
PLATELET # BLD AUTO: 82 K/UL
PMV BLD: 10.9 FL
RBC # BLD: 4.98 M/UL
RBC # FLD: 12.3 %
WBC # FLD AUTO: 6.74 K/UL

## 2019-05-30 ENCOUNTER — APPOINTMENT (OUTPATIENT)
Dept: GASTROENTEROLOGY | Facility: HOSPITAL | Age: 69
End: 2019-05-30

## 2019-07-17 ENCOUNTER — APPOINTMENT (OUTPATIENT)
Dept: HEMATOLOGY ONCOLOGY | Facility: CLINIC | Age: 69
End: 2019-07-17
Payer: MEDICARE

## 2019-07-17 ENCOUNTER — LABORATORY RESULT (OUTPATIENT)
Age: 69
End: 2019-07-17

## 2019-07-17 ENCOUNTER — OUTPATIENT (OUTPATIENT)
Dept: OUTPATIENT SERVICES | Facility: HOSPITAL | Age: 69
LOS: 1 days | Discharge: HOME | End: 2019-07-17

## 2019-07-17 VITALS
RESPIRATION RATE: 14 BRPM | DIASTOLIC BLOOD PRESSURE: 79 MMHG | HEIGHT: 69 IN | SYSTOLIC BLOOD PRESSURE: 111 MMHG | BODY MASS INDEX: 35.1 KG/M2 | WEIGHT: 237 LBS | HEART RATE: 87 BPM | TEMPERATURE: 98.6 F

## 2019-07-17 DIAGNOSIS — D69.6 THROMBOCYTOPENIA, UNSPECIFIED: ICD-10-CM

## 2019-07-17 PROCEDURE — 99212 OFFICE O/P EST SF 10 MIN: CPT

## 2019-07-17 NOTE — PHYSICAL EXAM
[Fully active, able to carry on all pre-disease performance without restriction] : Status 0 - Fully active, able to carry on all pre-disease performance without restriction [Normal] : no JVD, no calf tenderness, venous stasis changes, varices [de-identified] : + palpable lymph node superior to right clavicle

## 2019-07-17 NOTE — HISTORY OF PRESENT ILLNESS
[de-identified] : 68 yo male, former patient of Dr. Dorsey, presents for followup for idiopathic thrombocytopenic purpura, initially diagnosed in 2012. He has not required treatment and remains on observation. He has no complaints today. Denies bleeding. He had a bone marrow biopsy in the past, which per patient report was noted to be negative. \par He has never had a trial of steroids. \par Additionally he was noted to have persistently elevated ferritin (600 range). He reports genetic testing for hemochromatosis was performed by Dr. oDrsey and was noted to be negative. \par He has h/o prostate cancer, he is s/p radical prostatectomy in 2007, he follows with urology Dr. Maico Estrella. \par His has family history positive for colon cancer in his brother diagnosed in his early 50s. Toy is not aware of any genetic predisposition to colon cancer in his family. He is due for his colonoscopy and was advised to make an appointment with them. his prior colonoscopy revealed polyps. \par \par \par  [de-identified] : 9/22/2017: Denies any complaints. No fever, nausea, vomiting, chest pain, abdominal pain, bowel and bladder problems. \par \par 12/20/201: No complaints today.\par \par 3/20/2018: Platelet count is slightly worse, he remains asymptomatic. He is now taking Curcuma. He was educated about signs of worsening thrombocytopenia.\par \par 10/17/2018: Patient is relieved that his platelet count is 103.  Needs to schedule an EGD/colonoscopy, but his gastroenterologist, Dr. Herrmann, is out on medical leave.  He is waiting to wait until he returns to work.  Also complains that he has noticed that his right "clavicle is swollen".  He needs to schedule a dental implant and is worried about how his ITP may affect the procedure.\par \par 11/1/2018: At this point Anne is not sure he will pursue the implant, he will keep me informed re this. Platelet count has been fluctuating 2/2 antibiotic use. Supraclavicular fullness has been persistent, we will proceed with ordering an US at this point. No other complaints today. \par \par 2/7/2019: No complaints, platelets slightly lower today, reports no bleeding. Will plan to repeat in a month, patient was educated re bleeding symptoms. \par \par 5/7/19: Patient came for follow up visit, he reported feeling well . We reviewed CBC from today. Platelet count is 108,000, therefore steroid administration is not indicated. Patient is scheduled for EGD and Colonoscopy on 5/30/19. He is also scheduled for dental implant placement on 5/22/19. He will come with to repeat CBC on 5/17/19 for possible steroid treatment immediately prior to his procedures. Currently he may undergo all his procedures with average expected risk of bleeding complications. \par also we will monitor CBC after the EGD and Colonoscopy.

## 2019-07-17 NOTE — REVIEW OF SYSTEMS
[Swollen Glands] : swollen glands [Negative] : Integumentary [de-identified] : + LAD superior to right clavicle

## 2019-07-17 NOTE — ASSESSMENT
[FreeTextEntry1] : ITP \par --Has never had or required a trial of steroids\par --Patient will come on 5/17/19 to repeat CBC prior to dental implant with possible steroid treatment if platelet below the target, he will additionally check CBC post procedure and prior to upcoming colonoscopy on 5/30/2019\par \par R supraclavicular LAD-? reactive secondary to dental infection. Will return to clinic in 2-3 weeks. The node has not decreased or resolved, US on 11/2018 revealed 2 morphologically normal LN in R supraclavicular area.\par --Will plan for repeat sono after his procedures, no currently ordered \par \par Prostate cancer, in remission\par --patient follows up with urology yearly\par \par HCM\par -Patient will schedule EGD/colonoscopy on 5/30/19. \par \par  Patient seen and examined by Dr Matos who agreed for the above plan of care. \par \par CBC in 5/17/19 , follow up in 1 month or earlier if needed

## 2019-07-17 NOTE — REVIEW OF SYSTEMS
[Swollen Glands] : swollen glands [Negative] : Integumentary [de-identified] : + LAD superior to right clavicle

## 2019-07-17 NOTE — HISTORY OF PRESENT ILLNESS
[de-identified] : 66 yo male, former patient of Dr. Dorsey, presents for followup for idiopathic thrombocytopenic purpura, initially diagnosed in 2012. He has not required treatment and remains on observation. He has no complaints today. Denies bleeding. He had a bone marrow biopsy in the past, which per patient report was noted to be negative. \par He has never had a trial of steroids. \par Additionally he was noted to have persistently elevated ferritin (600 range). He reports genetic testing for hemochromatosis was performed by Dr. Dorsey and was noted to be negative. \par He has h/o prostate cancer, he is s/p radical prostatectomy in 2007, he follows with urology Dr. Maico Estrella. \par His has family history positive for colon cancer in his brother diagnosed in his early 50s. Toy is not aware of any genetic predisposition to colon cancer in his family. He is due for his colonoscopy and was advised to make an appointment with them. his prior colonoscopy revealed polyps. \par \par \par  [de-identified] : 9/22/2017: Denies any complaints. No fever, nausea, vomiting, chest pain, abdominal pain, bowel and bladder problems. \par \par 12/20/201: No complaints today.\par \par 3/20/2018: Platelet count is slightly worse, he remains asymptomatic. He is now taking Curcuma. He was educated about signs of worsening thrombocytopenia.\par \par 10/17/2018: Patient is relieved that his platelet count is 103.  Needs to schedule an EGD/colonoscopy, but his gastroenterologist, Dr. Herrmann, is out on medical leave.  He is waiting to wait until he returns to work.  Also complains that he has noticed that his right "clavicle is swollen".  He needs to schedule a dental implant and is worried about how his ITP may affect the procedure.\par \par 11/1/2018: At this point Anne is not sure he will pursue the implant, he will keep me informed re this. Platelet count has been fluctuating 2/2 antibiotic use. Supraclavicular fullness has been persistent, we will proceed with ordering an US at this point. No other complaints today. \par \par 2/7/2019: No complaints, platelets slightly lower today, reports no bleeding. Will plan to repeat in a month, patient was educated re bleeding symptoms. \par \par 5/7/19: Patient came for follow up visit, he reported feeling well . We reviewed CBC from today. Platelet count is 108,000, therefore steroid administration is not indicated. Patient is scheduled for EGD and Colonoscopy on 5/30/19. He is also scheduled for dental implant placement on 5/22/19. He will come with to repeat CBC on 5/17/19 for possible steroid treatment immediately prior to his procedures. Currently he may undergo all his procedures with average expected risk of bleeding complications. \par also we will monitor CBC after the EGD and Colonoscopy. \par \par 7/17/2019: Since his last visit Toy had an EGD and was told he has erosive esophagitis, he was started on Protonix 80mg, he is currently taking it. No complaints, he denies bleeding symptoms.

## 2019-07-17 NOTE — REVIEW OF SYSTEMS
[Swollen Glands] : swollen glands [Negative] : Integumentary [de-identified] : + LAD superior to right clavicle

## 2019-07-17 NOTE — PHYSICAL EXAM
[Fully active, able to carry on all pre-disease performance without restriction] : Status 0 - Fully active, able to carry on all pre-disease performance without restriction [Normal] : no JVD, no calf tenderness, venous stasis changes, varices [de-identified] : + palpable lymph node superior to right clavicle

## 2019-07-17 NOTE — PHYSICAL EXAM
[Fully active, able to carry on all pre-disease performance without restriction] : Status 0 - Fully active, able to carry on all pre-disease performance without restriction [Normal] : no JVD, no calf tenderness, venous stasis changes, varices [de-identified] : + palpable lymph node superior to right clavicle

## 2019-07-17 NOTE — HISTORY OF PRESENT ILLNESS
[de-identified] : 66 yo male, former patient of Dr. Dorsey, presents for followup for idiopathic thrombocytopenic purpura, initially diagnosed in 2012. He has not required treatment and remains on observation. He has no complaints today. Denies bleeding. He had a bone marrow biopsy in the past, which per patient report was noted to be negative. \par He has never had a trial of steroids. \par Additionally he was noted to have persistently elevated ferritin (600 range). He reports genetic testing for hemochromatosis was performed by Dr. Dorsey and was noted to be negative. \par He has h/o prostate cancer, he is s/p radical prostatectomy in 2007, he follows with urology Dr. Maico Estrella. \par His has family history positive for colon cancer in his brother diagnosed in his early 50s. Toy is not aware of any genetic predisposition to colon cancer in his family. He is due for his colonoscopy and was advised to make an appointment with them. his prior colonoscopy revealed polyps. \par \par \par  [de-identified] : 9/22/2017: Denies any complaints. No fever, nausea, vomiting, chest pain, abdominal pain, bowel and bladder problems. \par \par 12/20/201: No complaints today.\par \par 3/20/2018: Platelet count is slightly worse, he remains asymptomatic. He is now taking Curcuma. He was educated about signs of worsening thrombocytopenia.\par \par 10/17/2018: Patient is relieved that his platelet count is 103.  Needs to schedule an EGD/colonoscopy, but his gastroenterologist, Dr. Herrmann, is out on medical leave.  He is waiting to wait until he returns to work.  Also complains that he has noticed that his right "clavicle is swollen".  He needs to schedule a dental implant and is worried about how his ITP may affect the procedure.\par \par 11/1/2018: At this point Anne is not sure he will pursue the implant, he will keep me informed re this. Platelet count has been fluctuating 2/2 antibiotic use. Supraclavicular fullness has been persistent, we will proceed with ordering an US at this point. No other complaints today. \par \par 2/7/2019: No complaints, platelets slightly lower today, reports no bleeding. Will plan to repeat in a month, patient was educated re bleeding symptoms. \par \par 5/7/19: Patient came for follow up visit, he reported feeling well . We reviewed CBC from today. Platelet count is 108,000, therefore steroid administration is not indicated. Patient is scheduled for EGD and Colonoscopy on 5/30/19. He is also scheduled for dental implant placement on 5/22/19. He will come with to repeat CBC on 5/17/19 for possible steroid treatment immediately prior to his procedures. Currently he may undergo all his procedures with average expected risk of bleeding complications. \par also we will monitor CBC after the EGD and Colonoscopy.

## 2019-07-17 NOTE — ASSESSMENT
[FreeTextEntry1] : ITP \par --Has never had or required a trial of steroids\par --On close observation \par \par R supraclavicular LAD-? reactive secondary to dental infection. Will return to clinic in 2-3 weeks. The node has not decreased or resolved, US on 11/2018 revealed 2 morphologically normal LN in R supraclavicular area.\par --Will plan for repeat sono after his procedures, no currently ordered \par \par Prostate cancer, in remission\par --patient follows up with urology yearly\par \par HCM\par -Patient had EGD/colonoscopy on 5/30/19. \par \par Follow up in 2 months or earlier if needed

## 2019-07-18 LAB
HCT VFR BLD CALC: 46.5 %
HGB BLD-MCNC: 16.4 G/DL
MCHC RBC-ENTMCNC: 31.5 PG
MCHC RBC-ENTMCNC: 35.3 G/DL
MCV RBC AUTO: 89.3 FL
PLATELET # BLD AUTO: 96 K/UL
PMV BLD: 11.3 FL
RBC # BLD: 5.21 M/UL
RBC # FLD: 12.3 %
WBC # FLD AUTO: 6.77 K/UL

## 2019-10-10 ENCOUNTER — OTHER (OUTPATIENT)
Age: 69
End: 2019-10-10

## 2019-10-10 ENCOUNTER — LABORATORY RESULT (OUTPATIENT)
Age: 69
End: 2019-10-10

## 2019-10-10 ENCOUNTER — APPOINTMENT (OUTPATIENT)
Dept: HEMATOLOGY ONCOLOGY | Facility: CLINIC | Age: 69
End: 2019-10-10
Payer: MEDICARE

## 2019-10-10 ENCOUNTER — TRANSCRIPTION ENCOUNTER (OUTPATIENT)
Age: 69
End: 2019-10-10

## 2019-10-10 VITALS
BODY MASS INDEX: 35.1 KG/M2 | TEMPERATURE: 97.7 F | HEIGHT: 69 IN | WEIGHT: 237 LBS | SYSTOLIC BLOOD PRESSURE: 122 MMHG | DIASTOLIC BLOOD PRESSURE: 65 MMHG | HEART RATE: 74 BPM

## 2019-10-10 LAB
HCT VFR BLD CALC: 46.5 %
HGB BLD-MCNC: 16.6 G/DL
MCHC RBC-ENTMCNC: 31.8 PG
MCHC RBC-ENTMCNC: 35.7 G/DL
MCV RBC AUTO: 89.1 FL
PLATELET # BLD AUTO: 201 K/UL
PMV BLD: 11.9 FL
RBC # BLD: 5.22 M/UL
RBC # FLD: 12.3 %
WBC # FLD AUTO: 6.24 K/UL

## 2019-10-10 PROCEDURE — 99212 OFFICE O/P EST SF 10 MIN: CPT

## 2019-10-10 NOTE — ASSESSMENT
[FreeTextEntry1] : ITP \par --Has never had or required a trial of steroids\par --On close observation \par --Platelet clumping also noted on 10/10/2019, not overtly present on smear, but blue top rerun revealed platelet count of 202K\par \par R supraclavicular LAD-? reactive secondary to dental infection. Will return to clinic in 2-3 weeks. The node has not decreased or resolved, US on 11/2018 revealed 2 morphologically normal LN in R supraclavicular area.\par --Will plan for repeat sono after his procedures, no currently ordered \par \par Prostate cancer, in remission\par --patient follows up with urology yearly\par \par HCM\par -Patient had EGD/colonoscopy on 5/30/19. \par \par Follow up in 2 months or earlier if needed

## 2019-10-10 NOTE — REVIEW OF SYSTEMS
[Swollen Glands] : swollen glands [Negative] : Musculoskeletal [de-identified] : + LAD superior to right clavicle

## 2019-10-10 NOTE — PHYSICAL EXAM
[Fully active, able to carry on all pre-disease performance without restriction] : Status 0 - Fully active, able to carry on all pre-disease performance without restriction [Normal] : no JVD, no calf tenderness, venous stasis changes, varices [de-identified] : + palpable lymph node superior to right clavicle

## 2019-10-17 ENCOUNTER — APPOINTMENT (OUTPATIENT)
Dept: HEMATOLOGY ONCOLOGY | Facility: CLINIC | Age: 69
End: 2019-10-17

## 2019-10-17 ENCOUNTER — LABORATORY RESULT (OUTPATIENT)
Age: 69
End: 2019-10-17

## 2019-10-21 LAB
HCT VFR BLD CALC: 48.7 %
HGB BLD-MCNC: 17.1 G/DL
MCHC RBC-ENTMCNC: 30.7 PG
MCHC RBC-ENTMCNC: 35.1 G/DL
MCV RBC AUTO: 87.4 FL
PLATELET # BLD AUTO: 178 K/UL
PMV BLD: 9.4 FL
RBC # BLD: 5.57 M/UL
RBC # FLD: 12.5 %
WBC # FLD AUTO: 6.76 K/UL

## 2019-10-24 ENCOUNTER — LABORATORY RESULT (OUTPATIENT)
Age: 69
End: 2019-10-24

## 2019-10-24 ENCOUNTER — APPOINTMENT (OUTPATIENT)
Dept: HEMATOLOGY ONCOLOGY | Facility: CLINIC | Age: 69
End: 2019-10-24

## 2019-10-24 LAB
HCT VFR BLD CALC: 45.5 %
HGB BLD-MCNC: 15.9 G/DL
MCHC RBC-ENTMCNC: 31.1 PG
MCHC RBC-ENTMCNC: 34.9 G/DL
MCV RBC AUTO: 89 FL
PLATELET # BLD AUTO: 145 K/UL
PMV BLD: 9.1 FL
RBC # BLD: 5.11 M/UL
RBC # FLD: 12.5 %
WBC # FLD AUTO: 6.13 K/UL

## 2019-10-31 ENCOUNTER — LABORATORY RESULT (OUTPATIENT)
Age: 69
End: 2019-10-31

## 2019-10-31 ENCOUNTER — APPOINTMENT (OUTPATIENT)
Dept: HEMATOLOGY ONCOLOGY | Facility: CLINIC | Age: 69
End: 2019-10-31

## 2019-10-31 LAB
HCT VFR BLD CALC: 45.8 %
HGB BLD-MCNC: 16.2 G/DL
MCHC RBC-ENTMCNC: 31 PG
MCHC RBC-ENTMCNC: 35.4 G/DL
MCV RBC AUTO: 87.7 FL
PLATELET # BLD AUTO: 174 K/UL
PMV BLD: 9.5 FL
RBC # BLD: 5.22 M/UL
RBC # FLD: 12.4 %
WBC # FLD AUTO: 5.92 K/UL

## 2019-11-07 ENCOUNTER — LABORATORY RESULT (OUTPATIENT)
Age: 69
End: 2019-11-07

## 2019-11-07 ENCOUNTER — APPOINTMENT (OUTPATIENT)
Dept: HEMATOLOGY ONCOLOGY | Facility: CLINIC | Age: 69
End: 2019-11-07
Payer: MEDICARE

## 2019-11-07 VITALS
SYSTOLIC BLOOD PRESSURE: 120 MMHG | DIASTOLIC BLOOD PRESSURE: 58 MMHG | HEART RATE: 73 BPM | WEIGHT: 236 LBS | TEMPERATURE: 97.5 F | RESPIRATION RATE: 14 BRPM | BODY MASS INDEX: 34.96 KG/M2 | HEIGHT: 69 IN

## 2019-11-07 DIAGNOSIS — Z00.00 ENCOUNTER FOR GENERAL ADULT MEDICAL EXAMINATION W/OUT ABNORMAL FINDINGS: ICD-10-CM

## 2019-11-07 PROCEDURE — 99212 OFFICE O/P EST SF 10 MIN: CPT

## 2019-11-07 NOTE — HISTORY OF PRESENT ILLNESS
[de-identified] : 66 yo male, former patient of Dr. Dorsey, presents for followup for idiopathic thrombocytopenic purpura, initially diagnosed in 2012. He has not required treatment and remains on observation. He has no complaints today. Denies bleeding. He had a bone marrow biopsy in the past, which per patient report was noted to be negative. \par He has never had a trial of steroids. \par Additionally he was noted to have persistently elevated ferritin (600 range). He reports genetic testing for hemochromatosis was performed by Dr. Dorsey and was noted to be negative. \par He has h/o prostate cancer, he is s/p radical prostatectomy in 2007, he follows with urology Dr. Maico Estrella. \par His has family history positive for colon cancer in his brother diagnosed in his early 50s. Toy is not aware of any genetic predisposition to colon cancer in his family. He is due for his colonoscopy and was advised to make an appointment with them. his prior colonoscopy revealed polyps. \par \par \par  [de-identified] : 9/22/2017: Denies any complaints. No fever, nausea, vomiting, chest pain, abdominal pain, bowel and bladder problems. \par \par 12/20/201: No complaints today.\par \par 3/20/2018: Platelet count is slightly worse, he remains asymptomatic. He is now taking Curcuma. He was educated about signs of worsening thrombocytopenia.\par \par 10/17/2018: Patient is relieved that his platelet count is 103.  Needs to schedule an EGD/colonoscopy, but his gastroenterologist, Dr. Herrmann, is out on medical leave.  He is waiting to wait until he returns to work.  Also complains that he has noticed that his right "clavicle is swollen".  He needs to schedule a dental implant and is worried about how his ITP may affect the procedure.\par \par 11/1/2018: At this point Anne is not sure he will pursue the implant, he will keep me informed re this. Platelet count has been fluctuating 2/2 antibiotic use. Supraclavicular fullness has been persistent, we will proceed with ordering an US at this point. No other complaints today. \par \par 2/7/2019: No complaints, platelets slightly lower today, reports no bleeding. Will plan to repeat in a month, patient was educated re bleeding symptoms. \par \par 5/7/19: Patient came for follow up visit, he reported feeling well . We reviewed CBC from today. Platelet count is 108,000, therefore steroid administration is not indicated. Patient is scheduled for EGD and Colonoscopy on 5/30/19. He is also scheduled for dental implant placement on 5/22/19. He will come with to repeat CBC on 5/17/19 for possible steroid treatment immediately prior to his procedures. Currently he may undergo all his procedures with average expected risk of bleeding complications. \par also we will monitor CBC after the EGD and Colonoscopy. \par \par 7/17/2019: Since his last visit Toy had an EGD and was told he has erosive esophagitis, he was started on Protonix 80mg, he is currently taking it. No complaints, he denies bleeding symptoms. \par \par 10/10/2019: Toy reports a recent mild URI, platelets today are lower. He had labs at [facility] - platelets were not reported 2/2 clumping, we have rerun his platelet count today in a elijah tube, count was 202K. I have reviewed the smear today I did not appreciate significant clumping. We will repeat CBC in a blue top next week. He feels well report no bleeding. \par \par 11/7/2019: During last visit Toy was noted to have platelet clumping, his platelet count is persistently normal when drawn in a elijah top tube. He will start back on curcumin supplementation for joint health, we will recheck CBC in 1 month. Will repeat neck US to unsure stability of LN noted in 11/2019.

## 2019-11-07 NOTE — PHYSICAL EXAM
[Fully active, able to carry on all pre-disease performance without restriction] : Status 0 - Fully active, able to carry on all pre-disease performance without restriction [Normal] : no JVD, no calf tenderness, venous stasis changes, varices [de-identified] : + palpable lymph node superior to right clavicle

## 2019-11-07 NOTE — ASSESSMENT
[FreeTextEntry1] : ?ITP/platelet clumping \par --Has never had or required a trial of steroids\par --On observation \par --Platelet clumping also noted on 10/10/2019, not overtly present on smear, but blue top rerun revealed platelet count of 202K\par --Repeat CBC in 1 month \par \par R supraclavicular LAD-? reactive secondary to dental infection. Will return to clinic in 2-3 weeks. The node has not decreased or resolved, US on 11/2018 revealed 2 morphologically normal LN in R supraclavicular area.\par --Repeat sono was ordered on 11/7/2019 \par \par Prostate cancer, in remission\par --patient follows up with urology yearly\par \par HCM\par -Patient had EGD/colonoscopy on 5/30/19. \par \par Follow up in 6 months or earlier if needed

## 2019-11-07 NOTE — REVIEW OF SYSTEMS
[Swollen Glands] : swollen glands [Negative] : Integumentary [de-identified] : + LAD superior to right clavicle

## 2019-11-15 ENCOUNTER — OUTPATIENT (OUTPATIENT)
Dept: OUTPATIENT SERVICES | Facility: HOSPITAL | Age: 69
LOS: 1 days | Discharge: HOME | End: 2019-11-15
Payer: MEDICARE

## 2019-11-15 DIAGNOSIS — R22.0 LOCALIZED SWELLING, MASS AND LUMP, HEAD: ICD-10-CM

## 2019-11-15 LAB
HCT VFR BLD CALC: 44.9 %
HGB BLD-MCNC: 15.9 G/DL
MCHC RBC-ENTMCNC: 31.2 PG
MCHC RBC-ENTMCNC: 35.4 G/DL
MCV RBC AUTO: 88.2 FL
PLATELET # BLD AUTO: 172 K/UL
PMV BLD: 9.4 FL
RBC # BLD: 5.09 M/UL
RBC # FLD: 12.5 %
WBC # FLD AUTO: 6.1 K/UL

## 2019-11-15 PROCEDURE — 76882 US LMTD JT/FCL EVL NVASC XTR: CPT | Mod: 26,RT

## 2019-11-18 ENCOUNTER — OTHER (OUTPATIENT)
Age: 69
End: 2019-11-18

## 2019-11-18 DIAGNOSIS — R93.89 ABNORMAL FINDINGS ON DIAGNOSTIC IMAGING OF OTHER SPECIFIED BODY STRUCTURES: ICD-10-CM

## 2019-11-19 ENCOUNTER — APPOINTMENT (OUTPATIENT)
Dept: HEMATOLOGY ONCOLOGY | Facility: CLINIC | Age: 69
End: 2019-11-19

## 2019-11-19 LAB
ALBUMIN SERPL ELPH-MCNC: 4.9 G/DL
ALP BLD-CCNC: 64 U/L
ALT SERPL-CCNC: 30 U/L
ANION GAP SERPL CALC-SCNC: 15 MMOL/L
AST SERPL-CCNC: 23 U/L
BILIRUB SERPL-MCNC: 0.6 MG/DL
BUN SERPL-MCNC: 14 MG/DL
CALCIUM SERPL-MCNC: 9.7 MG/DL
CHLORIDE SERPL-SCNC: 101 MMOL/L
CO2 SERPL-SCNC: 24 MMOL/L
CREAT SERPL-MCNC: 1.3 MG/DL
GLUCOSE SERPL-MCNC: 55 MG/DL
POTASSIUM SERPL-SCNC: 4.3 MMOL/L
PROT SERPL-MCNC: 7.6 G/DL
SODIUM SERPL-SCNC: 140 MMOL/L

## 2019-11-21 ENCOUNTER — OUTPATIENT (OUTPATIENT)
Dept: OUTPATIENT SERVICES | Facility: HOSPITAL | Age: 69
LOS: 1 days | Discharge: HOME | End: 2019-11-21
Payer: MEDICARE

## 2019-11-21 DIAGNOSIS — R93.89 ABNORMAL FINDINGS ON DIAGNOSTIC IMAGING OF OTHER SPECIFIED BODY STRUCTURES: ICD-10-CM

## 2019-11-21 PROCEDURE — 71552 MRI CHEST W/O & W/DYE: CPT | Mod: 26

## 2019-12-19 ENCOUNTER — OUTPATIENT (OUTPATIENT)
Dept: OUTPATIENT SERVICES | Facility: HOSPITAL | Age: 69
LOS: 1 days | Discharge: HOME | End: 2019-12-19

## 2019-12-19 ENCOUNTER — LABORATORY RESULT (OUTPATIENT)
Age: 69
End: 2019-12-19

## 2019-12-19 ENCOUNTER — APPOINTMENT (OUTPATIENT)
Dept: HEMATOLOGY ONCOLOGY | Facility: CLINIC | Age: 69
End: 2019-12-19
Payer: MEDICARE

## 2019-12-19 VITALS
BODY MASS INDEX: 34.96 KG/M2 | RESPIRATION RATE: 14 BRPM | HEIGHT: 69 IN | HEART RATE: 70 BPM | SYSTOLIC BLOOD PRESSURE: 118 MMHG | WEIGHT: 236 LBS | DIASTOLIC BLOOD PRESSURE: 79 MMHG | TEMPERATURE: 96.7 F

## 2019-12-19 DIAGNOSIS — D69.3 IMMUNE THROMBOCYTOPENIC PURPURA: ICD-10-CM

## 2019-12-19 DIAGNOSIS — R93.89 ABNORMAL FINDINGS ON DIAGNOSTIC IMAGING OF OTHER SPECIFIED BODY STRUCTURES: ICD-10-CM

## 2019-12-19 DIAGNOSIS — D69.6 THROMBOCYTOPENIA, UNSPECIFIED: ICD-10-CM

## 2019-12-19 DIAGNOSIS — Z00.00 ENCOUNTER FOR GENERAL ADULT MEDICAL EXAMINATION WITHOUT ABNORMAL FINDINGS: ICD-10-CM

## 2019-12-19 LAB
HCT VFR BLD CALC: 46.6 %
HGB BLD-MCNC: 16.3 G/DL
MCHC RBC-ENTMCNC: 31.6 PG
MCHC RBC-ENTMCNC: 35 G/DL
MCV RBC AUTO: 90.3 FL
PLATELET # BLD AUTO: 180 K/UL
PMV BLD: 9.8 FL
RBC # BLD: 5.16 M/UL
RBC # FLD: 12.5 %
WBC # FLD AUTO: 8.91 K/UL

## 2019-12-19 PROCEDURE — 99213 OFFICE O/P EST LOW 20 MIN: CPT

## 2019-12-25 PROBLEM — R93.89 ABNORMAL ULTRASOUND OF NECK: Status: ACTIVE | Noted: 2019-11-18

## 2019-12-25 NOTE — HISTORY OF PRESENT ILLNESS
[de-identified] : 68 yo male, former patient of Dr. Dorsey, presents for followup for idiopathic thrombocytopenic purpura, initially diagnosed in 2012. He has not required treatment and remains on observation. He has no complaints today. Denies bleeding. He had a bone marrow biopsy in the past, which per patient report was noted to be negative. \par He has never had a trial of steroids. \par Additionally he was noted to have persistently elevated ferritin (600 range). He reports genetic testing for hemochromatosis was performed by Dr. Dorsey and was noted to be negative. \par He has h/o prostate cancer, he is s/p radical prostatectomy in 2007, he follows with urology Dr. Maico Estrella. \par His has family history positive for colon cancer in his brother diagnosed in his early 50s. Toy is not aware of any genetic predisposition to colon cancer in his family. He is due for his colonoscopy and was advised to make an appointment with them. his prior colonoscopy revealed polyps. \par \par \par  [de-identified] : 9/22/2017: Denies any complaints. No fever, nausea, vomiting, chest pain, abdominal pain, bowel and bladder problems. \par \par 12/20/201: No complaints today.\par \par 3/20/2018: Platelet count is slightly worse, he remains asymptomatic. He is now taking Curcuma. He was educated about signs of worsening thrombocytopenia.\par \par 10/17/2018: Patient is relieved that his platelet count is 103.  Needs to schedule an EGD/colonoscopy, but his gastroenterologist, Dr. Herrmann, is out on medical leave.  He is waiting to wait until he returns to work.  Also complains that he has noticed that his right "clavicle is swollen".  He needs to schedule a dental implant and is worried about how his ITP may affect the procedure.\par \par 11/1/2018: At this point Anne is not sure he will pursue the implant, he will keep me informed re this. Platelet count has been fluctuating 2/2 antibiotic use. Supraclavicular fullness has been persistent, we will proceed with ordering an US at this point. No other complaints today. \par \par 2/7/2019: No complaints, platelets slightly lower today, reports no bleeding. Will plan to repeat in a month, patient was educated re bleeding symptoms. \par \par 5/7/19: Patient came for follow up visit, he reported feeling well . We reviewed CBC from today. Platelet count is 108,000, therefore steroid administration is not indicated. Patient is scheduled for EGD and Colonoscopy on 5/30/19. He is also scheduled for dental implant placement on 5/22/19. He will come with to repeat CBC on 5/17/19 for possible steroid treatment immediately prior to his procedures. Currently he may undergo all his procedures with average expected risk of bleeding complications. \par also we will monitor CBC after the EGD and Colonoscopy. \par \par 7/17/2019: Since his last visit Toy had an EGD and was told he has erosive esophagitis, he was started on Protonix 80mg, he is currently taking it. No complaints, he denies bleeding symptoms. \par \par 10/10/2019: Toy reports a recent mild URI, platelets today are lower. He had labs at [facility] - platelets were not reported 2/2 clumping, we have rerun his platelet count today in a elijah tube, count was 202K. I have reviewed the smear today I did not appreciate significant clumping. We will repeat CBC in a blue top next week. He feels well report no bleeding. \par \par 11/7/2019: During last visit Toy was noted to have platelet clumping, his platelet count is persistently normal when drawn in a elijah top tube. He will start back on curcumin supplementation for joint health, we will recheck CBC in 1 month. Will repeat neck US to unsure stability of LN noted in 11/2019. \par \par 12/19/2019: Toy came for a follow up visit, he reports feeling well. His platelet count is persistently normal when drawn in a blue top tube, thrombocytopenia is related to in vitro platelet clumping. He will start back on curcumin supplementation for joint health;\par MRI of the NECK from 11/12/19 reveals\par Small ganglion cyst along the lateral aspect of the right sternoclavicular \par joint measuring approximately 1.8 x 1.1 x 1.0 cm (series 9/20 and series \par 4/10) likely corresponds to the palpable region of interest. No additional \par masses are identified\par He had elevated liver enzymes, thought to be related to fatty liver, he  has intentionally lost 10 pounds. He is following with Hepatology at [facility].\par He also has developed gout and was started on steroids, that he has tapered off by now.

## 2019-12-25 NOTE — ASSESSMENT
[FreeTextEntry1] : ?ITP/platelet clumping \par --Has never had or required a trial of steroids\par --On observation \par --Platelet clumping also noted on 10/10/2019, not overtly present on smear, but blue top rerun revealed platelet count of 199 K\par --Repeat CBC in 4 months\par \par R supraclavicular LAD.\par  MRI of the Chest from 11/12/19 with  Small ganglion cyst along the lateral aspect of the right sternoclavicular \par \par Prostate cancer, in remission\par --patient follows up with urology yearly\par \par Left big toe Gout. \par --Patient reported pain in the left big toe secondary to consume high protein diet. \par --We recommend one dose of Colchicine 1.2 mg once, he will call his primary thereafter\par \par HCM\par -Patient had EGD/colonoscopy on 5/30/19. \par \par Follow up in 6 months or earlier if needed .\par patient seen and examined by Dr Matos who agreed for the above plan

## 2019-12-25 NOTE — REVIEW OF SYSTEMS
[Swollen Glands] : swollen glands [Negative] : Musculoskeletal [de-identified] : + LAD superior to right clavicle

## 2019-12-25 NOTE — PHYSICAL EXAM
[Fully active, able to carry on all pre-disease performance without restriction] : Status 0 - Fully active, able to carry on all pre-disease performance without restriction [Normal] : no JVD, no calf tenderness, venous stasis changes, varices [de-identified] : + palpable lymph node superior to right clavicle

## 2019-12-26 DIAGNOSIS — R93.89 ABNORMAL FINDINGS ON DIAGNOSTIC IMAGING OF OTHER SPECIFIED BODY STRUCTURES: ICD-10-CM

## 2020-06-19 ENCOUNTER — LABORATORY RESULT (OUTPATIENT)
Age: 70
End: 2020-06-19

## 2020-06-19 ENCOUNTER — APPOINTMENT (OUTPATIENT)
Dept: HEMATOLOGY ONCOLOGY | Facility: CLINIC | Age: 70
End: 2020-06-19
Payer: MEDICARE

## 2020-06-19 VITALS
DIASTOLIC BLOOD PRESSURE: 72 MMHG | RESPIRATION RATE: 14 BRPM | SYSTOLIC BLOOD PRESSURE: 120 MMHG | TEMPERATURE: 97.6 F | HEART RATE: 58 BPM | BODY MASS INDEX: 33.92 KG/M2 | WEIGHT: 229 LBS | HEIGHT: 69 IN

## 2020-06-19 LAB
HCT VFR BLD CALC: 46.6 %
HGB BLD-MCNC: 16.1 G/DL
MCHC RBC-ENTMCNC: 31.1 PG
MCHC RBC-ENTMCNC: 34.5 G/DL
MCV RBC AUTO: 90 FL
PLATELET # BLD AUTO: 195 K/UL
PMV BLD: 9.1 FL
RBC # BLD: 5.18 M/UL
RBC # FLD: 12.3 %
WBC # FLD AUTO: 6.42 K/UL

## 2020-06-19 PROCEDURE — 99212 OFFICE O/P EST SF 10 MIN: CPT

## 2020-06-26 NOTE — PHYSICAL EXAM
[Fully active, able to carry on all pre-disease performance without restriction] : Status 0 - Fully active, able to carry on all pre-disease performance without restriction [Normal] : no JVD, no calf tenderness, venous stasis changes, varices [de-identified] : + palpable lymph node superior to right clavicle

## 2020-06-26 NOTE — REVIEW OF SYSTEMS
[Swollen Glands] : swollen glands [Negative] : Integumentary [de-identified] : + LAD superior to right clavicle

## 2020-06-26 NOTE — ASSESSMENT
[FreeTextEntry1] : ?ITP/platelet clumping \par --Has never had or required a trial of steroids\par --On observation \par --Platelet clumping also noted on 10/10/2019, not overtly present on smear, but blue top rerun revealed platelet count of 199 K\par --Resolved on 6/19/2020 \par \par R supraclavicular LAD.\par  MRI of the Chest from 11/12/19 with  Small ganglion cyst along the lateral aspect of the right sternoclavicular \par \par Prostate cancer, in remission\par --patient follows up with urology yearly\par \par Left big toe Gout. \par --Patient reported pain in the left big toe secondary to consume high protein diet. \par --We recommend one dose of Colchicine 1.2 mg once, he will call his primary thereafter\par \par HCM\par -Patient had EGD/colonoscopy on 5/30/19. \par \par PRN follow up \par

## 2020-06-26 NOTE — HISTORY OF PRESENT ILLNESS
[de-identified] : 70 yo male, former patient of Dr. Dorsey, presents for followup for idiopathic thrombocytopenic purpura, initially diagnosed in 2012. He has not required treatment and remains on observation. He has no complaints today. Denies bleeding. He had a bone marrow biopsy in the past, which per patient report was noted to be negative. \par He has never had a trial of steroids. \par Additionally he was noted to have persistently elevated ferritin (600 range). He reports genetic testing for hemochromatosis was performed by Dr. Dorsey and was noted to be negative. \par He has h/o prostate cancer, he is s/p radical prostatectomy in 2007, he follows with urology Dr. Maico Estrella. \par His has family history positive for colon cancer in his brother diagnosed in his early 50s. Toy is not aware of any genetic predisposition to colon cancer in his family. He is due for his colonoscopy and was advised to make an appointment with them. his prior colonoscopy revealed polyps. \par \par \par  [de-identified] : 9/22/2017: Denies any complaints. No fever, nausea, vomiting, chest pain, abdominal pain, bowel and bladder problems. \par \par 12/20/201: No complaints today.\par \par 3/20/2018: Platelet count is slightly worse, he remains asymptomatic. He is now taking Curcuma. He was educated about signs of worsening thrombocytopenia.\par \par 10/17/2018: Patient is relieved that his platelet count is 103.  Needs to schedule an EGD/colonoscopy, but his gastroenterologist, Dr. Herrmann, is out on medical leave.  He is waiting to wait until he returns to work.  Also complains that he has noticed that his right "clavicle is swollen".  He needs to schedule a dental implant and is worried about how his ITP may affect the procedure.\par \par 11/1/2018: At this point Anne is not sure he will pursue the implant, he will keep me informed re this. Platelet count has been fluctuating 2/2 antibiotic use. Supraclavicular fullness has been persistent, we will proceed with ordering an US at this point. No other complaints today. \par \par 2/7/2019: No complaints, platelets slightly lower today, reports no bleeding. Will plan to repeat in a month, patient was educated re bleeding symptoms. \par \par 5/7/19: Patient came for follow up visit, he reported feeling well . We reviewed CBC from today. Platelet count is 108,000, therefore steroid administration is not indicated. Patient is scheduled for EGD and Colonoscopy on 5/30/19. He is also scheduled for dental implant placement on 5/22/19. He will come with to repeat CBC on 5/17/19 for possible steroid treatment immediately prior to his procedures. Currently he may undergo all his procedures with average expected risk of bleeding complications. \par also we will monitor CBC after the EGD and Colonoscopy. \par \par 7/17/2019: Since his last visit Toy had an EGD and was told he has erosive esophagitis, he was started on Protonix 80mg, he is currently taking it. No complaints, he denies bleeding symptoms. \par \par 10/10/2019: Toy reports a recent mild URI, platelets today are lower. He had labs at [facility] - platelets were not reported 2/2 clumping, we have rerun his platelet count today in a elijah tube, count was 202K. I have reviewed the smear today I did not appreciate significant clumping. We will repeat CBC in a blue top next week. He feels well report no bleeding. \par \par 11/7/2019: During last visit Toy was noted to have platelet clumping, his platelet count is persistently normal when drawn in a elijah top tube. He will start back on curcumin supplementation for joint health, we will recheck CBC in 1 month. Will repeat neck US to unsure stability of LN noted in 11/2019. \par \par 12/19/2019: Toy came for a follow up visit, he reports feeling well. His platelet count is persistently normal when drawn in a blue top tube, thrombocytopenia is related to in vitro platelet clumping. He will start back on curcumin supplementation for joint health;\par MRI of the NECK from 11/12/19 reveals\par Small ganglion cyst along the lateral aspect of the right sternoclavicular \par joint measuring approximately 1.8 x 1.1 x 1.0 cm (series 9/20 and series \par 4/10) likely corresponds to the palpable region of interest. No additional \par masses are identified\par He had elevated liver enzymes, thought to be related to fatty liver, he  has intentionally lost 10 pounds. He is following with Hepatology at [facility].\par He also has developed gout and was started on steroids, that he has tapered off by now. \par \par 6/19/2020: Toy presents for follow up, he reports no problems at all today, feeling well. \par CBC today reveals no major abnormalities. \par He reports [facility] is [facility] but [facility], I will request for non- formulary vaccine and if able to obtain we will administer.

## 2020-06-30 NOTE — REVIEW OF SYSTEMS
[Swollen Glands] : swollen glands [Negative] : Integumentary [de-identified] : + LAD superior to right clavicle

## 2020-06-30 NOTE — PHYSICAL EXAM
[Fully active, able to carry on all pre-disease performance without restriction] : Status 0 - Fully active, able to carry on all pre-disease performance without restriction [Normal] : no JVD, no calf tenderness, venous stasis changes, varices [de-identified] : + palpable lymph node superior to right clavicle

## 2020-06-30 NOTE — HISTORY OF PRESENT ILLNESS
[de-identified] : 9/22/2017: Denies any complaints. No fever, nausea, vomiting, chest pain, abdominal pain, bowel and bladder problems. \par \par 12/20/201: No complaints today.\par \par 3/20/2018: Platelet count is slightly worse, he remains asymptomatic. He is now taking Curcuma. He was educated about signs of worsening thrombocytopenia.\par \par 10/17/2018: Patient is relieved that his platelet count is 103.  Needs to schedule an EGD/colonoscopy, but his gastroenterologist, Dr. Herrmann, is out on medical leave.  He is waiting to wait until he returns to work.  Also complains that he has noticed that his right "clavicle is swollen".  He needs to schedule a dental implant and is worried about how his ITP may affect the procedure.\par \par 11/1/2018: At this point Anne is not sure he will pursue the implant, he will keep me informed re this. Platelet count has been fluctuating 2/2 antibiotic use. Supraclavicular fullness has been persistent, we will proceed with ordering an US at this point. No other complaints today. \par \par 2/7/2019: No complaints, platelets slightly lower today, reports no bleeding. Will plan to repeat in a month, patient was educated re bleeding symptoms. \par \par 5/7/19: Patient came for follow up visit, he reported feeling well . We reviewed CBC from today. Platelet count is 108,000, therefore steroid administration is not indicated. Patient is scheduled for EGD and Colonoscopy on 5/30/19. He is also scheduled for dental implant placement on 5/22/19. He will come with to repeat CBC on 5/17/19 for possible steroid treatment immediately prior to his procedures. Currently he may undergo all his procedures with average expected risk of bleeding complications. \par also we will monitor CBC after the EGD and Colonoscopy. \par \par 7/17/2019: Since his last visit Toy had an EGD and was told he has erosive esophagitis, he was started on Protonix 80mg, he is currently taking it. No complaints, he denies bleeding symptoms. \par \par 10/10/2019: Toy reports a recent mild URI, platelets today are lower. He had labs at [facility] - platelets were not reported 2/2 clumping, we have rerun his platelet count today in a elijah tube, count was 202K. I have reviewed the smear today I did not appreciate significant clumping. We will repeat CBC in a blue top next week. He feels well report no bleeding. \par \par 11/7/2019: During last visit Toy was noted to have platelet clumping, his platelet count is persistently normal when drawn in a elijah top tube. He will start back on curcumin supplementation for joint health, we will recheck CBC in 1 month. Will repeat neck US to unsure stability of LN noted in 11/2019. \par \par 12/19/2019: Toy came for a follow up visit, he reports feeling well. His platelet count is persistently normal when drawn in a blue top tube, thrombocytopenia is related to in vitro platelet clumping. He will start back on curcumin supplementation for joint health;\par MRI of the NECK from 11/12/19 reveals\par Small ganglion cyst along the lateral aspect of the right sternoclavicular \par joint measuring approximately 1.8 x 1.1 x 1.0 cm (series 9/20 and series \par 4/10) likely corresponds to the palpable region of interest. No additional \par masses are identified\par He had elevated liver enzymes, thought to be related to fatty liver, he  has intentionally lost 10 pounds. He is following with Hepatology at [facility].\par He also has developed gout and was started on steroids, that he has tapered off by now.  [de-identified] : 68 yo male, former patient of Dr. Dorsey, presents for followup for idiopathic thrombocytopenic purpura, initially diagnosed in 2012. He has not required treatment and remains on observation. He has no complaints today. Denies bleeding. He had a bone marrow biopsy in the past, which per patient report was noted to be negative. \par He has never had a trial of steroids. \par Additionally he was noted to have persistently elevated ferritin (600 range). He reports genetic testing for hemochromatosis was performed by Dr. Dorsey and was noted to be negative. \par He has h/o prostate cancer, he is s/p radical prostatectomy in 2007, he follows with urology Dr. Maico Estrella. \par His has family history positive for colon cancer in his brother diagnosed in his early 50s. Toy is not aware of any genetic predisposition to colon cancer in his family. He is due for his colonoscopy and was advised to make an appointment with them. his prior colonoscopy revealed polyps. \par \par \par

## 2020-07-03 ENCOUNTER — OUTPATIENT (OUTPATIENT)
Dept: OUTPATIENT SERVICES | Facility: HOSPITAL | Age: 70
LOS: 1 days | Discharge: HOME | End: 2020-07-03

## 2020-07-03 ENCOUNTER — LABORATORY RESULT (OUTPATIENT)
Age: 70
End: 2020-07-03

## 2020-07-03 DIAGNOSIS — Z11.59 ENCOUNTER FOR SCREENING FOR OTHER VIRAL DISEASES: ICD-10-CM

## 2020-07-07 ENCOUNTER — APPOINTMENT (OUTPATIENT)
Dept: INFUSION THERAPY | Facility: CLINIC | Age: 70
End: 2020-07-07

## 2020-07-07 ENCOUNTER — OUTPATIENT (OUTPATIENT)
Dept: OUTPATIENT SERVICES | Facility: HOSPITAL | Age: 70
LOS: 1 days | Discharge: HOME | End: 2020-07-07

## 2020-07-07 DIAGNOSIS — D69.6 THROMBOCYTOPENIA, UNSPECIFIED: ICD-10-CM

## 2020-07-07 DIAGNOSIS — Z23 ENCOUNTER FOR IMMUNIZATION: ICD-10-CM

## 2020-12-16 ENCOUNTER — APPOINTMENT (OUTPATIENT)
Dept: HEMATOLOGY ONCOLOGY | Facility: CLINIC | Age: 70
End: 2020-12-16
Payer: MEDICARE

## 2020-12-16 ENCOUNTER — LABORATORY RESULT (OUTPATIENT)
Age: 70
End: 2020-12-16

## 2020-12-16 ENCOUNTER — OUTPATIENT (OUTPATIENT)
Dept: OUTPATIENT SERVICES | Facility: HOSPITAL | Age: 70
LOS: 1 days | Discharge: HOME | End: 2020-12-16

## 2020-12-16 VITALS
HEART RATE: 64 BPM | TEMPERATURE: 97.3 F | SYSTOLIC BLOOD PRESSURE: 113 MMHG | BODY MASS INDEX: 34.8 KG/M2 | DIASTOLIC BLOOD PRESSURE: 84 MMHG | RESPIRATION RATE: 14 BRPM | HEIGHT: 69 IN | WEIGHT: 235 LBS

## 2020-12-16 DIAGNOSIS — H66.90 OTITIS MEDIA, UNSPECIFIED, UNSPECIFIED EAR: ICD-10-CM

## 2020-12-16 DIAGNOSIS — A69.20 LYME DISEASE, UNSPECIFIED: ICD-10-CM

## 2020-12-16 DIAGNOSIS — D69.3 IMMUNE THROMBOCYTOPENIC PURPURA: ICD-10-CM

## 2020-12-16 LAB
ALBUMIN SERPL ELPH-MCNC: 4.7 G/DL
ALP BLD-CCNC: 46 U/L
ALT SERPL-CCNC: 29 U/L
ANION GAP SERPL CALC-SCNC: 12 MMOL/L
AST SERPL-CCNC: 25 U/L
BILIRUB SERPL-MCNC: 0.7 MG/DL
BUN SERPL-MCNC: 17 MG/DL
CALCIUM SERPL-MCNC: 9.2 MG/DL
CHLORIDE SERPL-SCNC: 101 MMOL/L
CO2 SERPL-SCNC: 25 MMOL/L
CREAT SERPL-MCNC: 1.1 MG/DL
GLUCOSE SERPL-MCNC: 66 MG/DL
HCT VFR BLD CALC: 47.3 %
HGB BLD-MCNC: 16.5 G/DL
MCHC RBC-ENTMCNC: 31.4 PG
MCHC RBC-ENTMCNC: 34.9 G/DL
MCV RBC AUTO: 89.9 FL
PLATELET # BLD AUTO: 195 K/UL
PMV BLD: 8.7 FL
POTASSIUM SERPL-SCNC: 4.4 MMOL/L
PROT SERPL-MCNC: 7.1 G/DL
RBC # BLD: 5.26 M/UL
RBC # FLD: 12.6 %
SODIUM SERPL-SCNC: 138 MMOL/L
WBC # FLD AUTO: 6.4 K/UL

## 2020-12-16 PROCEDURE — 99212 OFFICE O/P EST SF 10 MIN: CPT

## 2021-01-13 NOTE — END OF VISIT
[FreeTextEntry3] : Patient was seen seen examined with NP Miguel, agree with above plan of care.\par

## 2021-01-13 NOTE — REVIEW OF SYSTEMS
[Swollen Glands] : swollen glands [Negative] : Integumentary [de-identified] : + LAD superior to right clavicle

## 2021-01-13 NOTE — PHYSICAL EXAM
[Fully active, able to carry on all pre-disease performance without restriction] : Status 0 - Fully active, able to carry on all pre-disease performance without restriction [Normal] : no JVD, no calf tenderness, venous stasis changes, varices [de-identified] : + palpable lymph node superior to right clavicle

## 2021-01-13 NOTE — ASSESSMENT
[FreeTextEntry1] : ?ITP/platelet clumping \par --Has never had or required a trial of steroids\par --On observation \par --Platelet clumping also noted on 10/10/2019, not overtly present on smear, but blue top rerun revealed platelet count of 199 K\par --Resolved on 6/19/2020.\par --Patient will remain on observation. \par \par R supraclavicular LAD.\par  MRI of the Chest from 11/12/19 with  Small ganglion cyst along the lateral aspect of the right sternoclavicular \par \par Prostate cancer, in remission\par --patient follows up with urology yearly\par \par HCM\par -Patient had EGD/colonoscopy on 5/30/19. \par \par PRN follow up in 6 months sooner if needed.\par Patient seen and case discussed by Dr Matos who agreed for the above plan of care. \par

## 2021-01-13 NOTE — HISTORY OF PRESENT ILLNESS
[de-identified] : 70 yo male, former patient of Dr. Dorsey, presents for followup for idiopathic thrombocytopenic purpura, initially diagnosed in 2012. He has not required treatment and remains on observation. He has no complaints today. Denies bleeding. He had a bone marrow biopsy in the past, which per patient report was noted to be negative. \par He has never had a trial of steroids. \par Additionally he was noted to have persistently elevated ferritin (600 range). He reports genetic testing for hemochromatosis was performed by Dr. Dorsey and was noted to be negative. \par He has h/o prostate cancer, he is s/p radical prostatectomy in 2007, he follows with urology Dr. Maico Estrella. \par His has family history positive for colon cancer in his brother diagnosed in his early 50s. Toy is not aware of any genetic predisposition to colon cancer in his family. He is due for his colonoscopy and was advised to make an appointment with them. his prior colonoscopy revealed polyps. \par \par \par  [de-identified] : 9/22/2017: Denies any complaints. No fever, nausea, vomiting, chest pain, abdominal pain, bowel and bladder problems. \par \par 12/20/201: No complaints today.\par \par 3/20/2018: Platelet count is slightly worse, he remains asymptomatic. He is now taking Curcuma. He was educated about signs of worsening thrombocytopenia.\par \par 10/17/2018: Patient is relieved that his platelet count is 103.  Needs to schedule an EGD/colonoscopy, but his gastroenterologist, Dr. Herrmann, is out on medical leave.  He is waiting to wait until he returns to work.  Also complains that he has noticed that his right "clavicle is swollen".  He needs to schedule a dental implant and is worried about how his ITP may affect the procedure.\par \par 11/1/2018: At this point Anne is not sure he will pursue the implant, he will keep me informed re this. Platelet count has been fluctuating 2/2 antibiotic use. Supraclavicular fullness has been persistent, we will proceed with ordering an US at this point. No other complaints today. \par \par 2/7/2019: No complaints, platelets slightly lower today, reports no bleeding. Will plan to repeat in a month, patient was educated re bleeding symptoms. \par \par 5/7/19: Patient came for follow up visit, he reported feeling well . We reviewed CBC from today. Platelet count is 108,000, therefore steroid administration is not indicated. Patient is scheduled for EGD and Colonoscopy on 5/30/19. He is also scheduled for dental implant placement on 5/22/19. He will come with to repeat CBC on 5/17/19 for possible steroid treatment immediately prior to his procedures. Currently he may undergo all his procedures with average expected risk of bleeding complications. \par also we will monitor CBC after the EGD and Colonoscopy. \par \par 7/17/2019: Since his last visit Toy had an EGD and was told he has erosive esophagitis, he was started on Protonix 80mg, he is currently taking it. No complaints, he denies bleeding symptoms. \par \par 10/10/2019: Toy reports a recent mild URI, platelets today are lower. He had labs at [facility] - platelets were not reported 2/2 clumping, we have rerun his platelet count today in a elijah tube, count was 202K. I have reviewed the smear today I did not appreciate significant clumping. We will repeat CBC in a blue top next week. He feels well report no bleeding. \par \par 11/7/2019: During last visit Toy was noted to have platelet clumping, his platelet count is persistently normal when drawn in a elijah top tube. He will start back on curcumin supplementation for joint health, we will recheck CBC in 1 month. Will repeat neck US to unsure stability of LN noted in 11/2019. \par \par 12/19/2019: Toy came for a follow up visit, he reports feeling well. His platelet count is persistently normal when drawn in a blue top tube, thrombocytopenia is related to in vitro platelet clumping. He will start back on curcumin supplementation for joint health;\par MRI of the NECK from 11/12/19 reveals\par Small ganglion cyst along the lateral aspect of the right sternoclavicular \par joint measuring approximately 1.8 x 1.1 x 1.0 cm (series 9/20 and series \par 4/10) likely corresponds to the palpable region of interest. No additional \par masses are identified\par He had elevated liver enzymes, thought to be related to fatty liver, he  has intentionally lost 10 pounds. He is following with Hepatology at [facility].\par He also has developed gout and was started on steroids, that he has tapered off by now. \par \par 6/19/2020: Toy presents for follow up, he reports no problems at all today, feeling well. \par CBC today reveals no major abnormalities. \par He reports that [facility], he is [facility] for [facility] but [facility], I will request for non- formulary vaccine and if able to obtain we will administer. \par \par 12/16/2020: Toy presents for follow up, he reports no problems at all today, feeling well. \par CBC today reveals HGB/ HCT  16.5/47.3 stable Platelet count is 195, 000 no major abnormalities. \par He completed Hept B/A vaccination series.\par Patient will remain on observation.  He is up to date with age appropriate screening.

## 2021-01-18 DIAGNOSIS — D69.3 IMMUNE THROMBOCYTOPENIC PURPURA: ICD-10-CM

## 2021-01-18 DIAGNOSIS — H66.90 OTITIS MEDIA, UNSPECIFIED, UNSPECIFIED EAR: ICD-10-CM

## 2021-01-18 DIAGNOSIS — A69.20 LYME DISEASE, UNSPECIFIED: ICD-10-CM

## 2021-01-18 DIAGNOSIS — D69.6 THROMBOCYTOPENIA, UNSPECIFIED: ICD-10-CM

## 2021-01-18 LAB
HAV IGM SER QL: NONREACTIVE
HBV CORE IGG+IGM SER QL: NONREACTIVE
HBV SURFACE AB SER QL: REACTIVE
HBV SURFACE AG SER QL: NONREACTIVE
HEPATITIS A IGG ANTIBODY: REACTIVE
SARS-COV-2 IGG SERPL IA-ACNC: <0.1 INDEX
SARS-COV-2 IGG SERPL QL IA: NEGATIVE

## 2021-06-01 ENCOUNTER — APPOINTMENT (OUTPATIENT)
Dept: PULMONOLOGY | Facility: CLINIC | Age: 71
End: 2021-06-01
Payer: MEDICARE

## 2021-06-01 VITALS
HEIGHT: 69 IN | DIASTOLIC BLOOD PRESSURE: 70 MMHG | SYSTOLIC BLOOD PRESSURE: 114 MMHG | RESPIRATION RATE: 14 BRPM | BODY MASS INDEX: 34.51 KG/M2 | HEART RATE: 79 BPM | OXYGEN SATURATION: 97 % | WEIGHT: 233 LBS

## 2021-06-01 PROCEDURE — G0296 VISIT TO DETERM LDCT ELIG: CPT

## 2021-06-01 PROCEDURE — 99213 OFFICE O/P EST LOW 20 MIN: CPT | Mod: 25

## 2021-06-01 RX ORDER — COLCHICINE 0.6 MG/1
0.6 TABLET ORAL DAILY
Qty: 2 | Refills: 0 | Status: COMPLETED | COMMUNITY
Start: 2019-12-19 | End: 2021-06-01

## 2021-06-01 NOTE — ASSESSMENT
[FreeTextEntry1] : \par The patient is benefitting from the CPAP.\par New supplies\par Weight loss\par Stress the need maintain compliance  with the CPAP.\par F/U in 12 months\par \par Pt quit smoking over 20 years ago not eligible for CT screening\par \par

## 2021-06-01 NOTE — HISTORY OF PRESENT ILLNESS
[Excess Weight] : excess weight [Weight Increase] : weight increase [Dyspnea] : dyspnea [Back Pain] : back pain [Follow-Up - Routine Clinic] : a routine clinic follow-up of [None] : No associated symptoms are reported [CPAP] : CPAP [Good Compliance] : good compliance with treatment [Good Tolerance] : good tolerance of treatment [Good Symptom Control] : good symptom control [TextBox_4] : I reviewed my original evaluation  and last notes.\par I reviewed all the previous sleep test that are available on file.\par \par

## 2021-06-16 ENCOUNTER — LABORATORY RESULT (OUTPATIENT)
Age: 71
End: 2021-06-16

## 2021-06-16 ENCOUNTER — APPOINTMENT (OUTPATIENT)
Dept: HEMATOLOGY ONCOLOGY | Facility: CLINIC | Age: 71
End: 2021-06-16

## 2021-06-16 LAB
HCT VFR BLD CALC: 45.7 %
HGB BLD-MCNC: 16 G/DL
MCHC RBC-ENTMCNC: 31.8 PG
MCHC RBC-ENTMCNC: 35 G/DL
MCV RBC AUTO: 90.9 FL
PLATELET # BLD AUTO: 178 K/UL
PMV BLD: 8.9 FL
RBC # BLD: 5.03 M/UL
RBC # FLD: 12.2 %
WBC # FLD AUTO: 6.22 K/UL

## 2021-06-17 LAB
ALBUMIN SERPL ELPH-MCNC: 4.7 G/DL
ALP BLD-CCNC: 51 U/L
ALT SERPL-CCNC: 29 U/L
ANION GAP SERPL CALC-SCNC: 12 MMOL/L
AST SERPL-CCNC: 28 U/L
BILIRUB SERPL-MCNC: 0.7 MG/DL
BUN SERPL-MCNC: 17 MG/DL
CALCIUM SERPL-MCNC: 9.6 MG/DL
CHLORIDE SERPL-SCNC: 101 MMOL/L
CO2 SERPL-SCNC: 25 MMOL/L
CREAT SERPL-MCNC: 1.2 MG/DL
GLUCOSE SERPL-MCNC: 79 MG/DL
POTASSIUM SERPL-SCNC: 4.3 MMOL/L
PROT SERPL-MCNC: 7.3 G/DL
SODIUM SERPL-SCNC: 138 MMOL/L

## 2021-06-23 ENCOUNTER — APPOINTMENT (OUTPATIENT)
Dept: HEMATOLOGY ONCOLOGY | Facility: CLINIC | Age: 71
End: 2021-06-23
Payer: MEDICARE

## 2021-06-23 ENCOUNTER — OUTPATIENT (OUTPATIENT)
Dept: OUTPATIENT SERVICES | Facility: HOSPITAL | Age: 71
LOS: 1 days | Discharge: HOME | End: 2021-06-23

## 2021-06-23 VITALS
OXYGEN SATURATION: 97 % | TEMPERATURE: 98.1 F | SYSTOLIC BLOOD PRESSURE: 108 MMHG | HEIGHT: 69 IN | BODY MASS INDEX: 34.07 KG/M2 | WEIGHT: 230 LBS | HEART RATE: 74 BPM | RESPIRATION RATE: 16 BRPM | DIASTOLIC BLOOD PRESSURE: 70 MMHG

## 2021-06-23 DIAGNOSIS — D69.6 THROMBOCYTOPENIA, UNSPECIFIED: ICD-10-CM

## 2021-06-23 DIAGNOSIS — R89.8 OTHER ABNORMAL FINDINGS IN SPECIMENS FROM OTHER ORGANS, SYSTEMS AND TISSUES: ICD-10-CM

## 2021-06-23 PROCEDURE — 99213 OFFICE O/P EST LOW 20 MIN: CPT

## 2021-06-23 NOTE — REVIEW OF SYSTEMS
[Swollen Glands] : swollen glands [Negative] : Integumentary [de-identified] : + LAD superior to right clavicle

## 2021-06-23 NOTE — ASSESSMENT
[FreeTextEntry1] : ?ITP/platelet clumping \par --Has never had or required a trial of steroids\par --On observation \par --Platelet clumping also noted on 10/10/2019, not overtly present on smear, but blue top rerun revealed platelet count of 199 K\par --Resolved on 6/19/2020.\par --Patient will remain on observation. \par \par R supraclavicular LAD.\par  MRI of the Chest from 11/12/19 with  Small ganglion cyst along the lateral aspect of the right sternoclavicular \par \par Prostate cancer, in remission\par --patient follows up with urology yearly\par \par HCM\par -Patient had EGD/colonoscopy on 5/30/19. \par \par PRN follow up \par

## 2021-06-23 NOTE — HISTORY OF PRESENT ILLNESS
[de-identified] : 70 yo male, former patient of Dr. Dorsey, presents for followup for idiopathic thrombocytopenic purpura, initially diagnosed in 2012. He has not required treatment and remains on observation. He has no complaints today. Denies bleeding. He had a bone marrow biopsy in the past, which per patient report was noted to be negative. \par He has never had a trial of steroids. \par Additionally he was noted to have persistently elevated ferritin (600 range). He reports genetic testing for hemochromatosis was performed by Dr. Dorsey and was noted to be negative. \par He has h/o prostate cancer, he is s/p radical prostatectomy in 2007, he follows with urology Dr. Maico Estrella. \par His has family history positive for colon cancer in his brother diagnosed in his early 50s. Toy is not aware of any genetic predisposition to colon cancer in his family. He is due for his colonoscopy and was advised to make an appointment with them. his prior colonoscopy revealed polyps. \par \par \par  [de-identified] : 9/22/2017: Denies any complaints. No fever, nausea, vomiting, chest pain, abdominal pain, bowel and bladder problems. \par \par 12/20/201: No complaints today.\par \par 3/20/2018: Platelet count is slightly worse, he remains asymptomatic. He is now taking Curcuma. He was educated about signs of worsening thrombocytopenia.\par \par 10/17/2018: Patient is relieved that his platelet count is 103.  Needs to schedule an EGD/colonoscopy, but his gastroenterologist, Dr. Herrmann, is out on medical leave.  He is waiting to wait until he returns to work.  Also complains that he has noticed that his right "clavicle is swollen".  He needs to schedule a dental implant and is worried about how his ITP may affect the procedure.\par \par 11/1/2018: At this point Anne is not sure he will pursue the implant, he will keep me informed re this. Platelet count has been fluctuating 2/2 antibiotic use. Supraclavicular fullness has been persistent, we will proceed with ordering an US at this point. No other complaints today. \par \par 2/7/2019: No complaints, platelets slightly lower today, reports no bleeding. Will plan to repeat in a month, patient was educated re bleeding symptoms. \par \par 5/7/19: Patient came for follow up visit, he reported feeling well . We reviewed CBC from today. Platelet count is 108,000, therefore steroid administration is not indicated. Patient is scheduled for EGD and Colonoscopy on 5/30/19. He is also scheduled for dental implant placement on 5/22/19. He will come with to repeat CBC on 5/17/19 for possible steroid treatment immediately prior to his procedures. Currently he may undergo all his procedures with average expected risk of bleeding complications. \par also we will monitor CBC after the EGD and Colonoscopy. \par \par 7/17/2019: Since his last visit Toy had an EGD and was told he has erosive esophagitis, he was started on Protonix 80mg, he is currently taking it. No complaints, he denies bleeding symptoms. \par \par 10/10/2019: Toy reports a recent mild URI, platelets today are lower. He had labs at [facility] - platelets were not reported 2/2 clumping, we have rerun his platelet count today in a elijah tube, count was 202K. I have reviewed the smear today I did not appreciate significant clumping. We will repeat CBC in a blue top next week. He feels well report no bleeding. \par \par 11/7/2019: During last visit Toy was noted to have platelet clumping, his platelet count is persistently normal when drawn in a elijah top tube. He will start back on curcumin supplementation for joint health, we will recheck CBC in 1 month. Will repeat neck US to unsure stability of LN noted in 11/2019. \par \par 12/19/2019: Toy came for a follow up visit, he reports feeling well. His platelet count is persistently normal when drawn in a blue top tube, thrombocytopenia is related to in vitro platelet clumping. He will start back on curcumin supplementation for joint health;\par MRI of the NECK from 11/12/19 reveals\par Small ganglion cyst along the lateral aspect of the right sternoclavicular \par joint measuring approximately 1.8 x 1.1 x 1.0 cm (series 9/20 and series \par 4/10) likely corresponds to the palpable region of interest. No additional \par masses are identified\par He had elevated liver enzymes, thought to be related to fatty liver, he  has intentionally lost 10 pounds. He is following with Hepatology at [facility].\par He also has developed gout and was started on steroids, that he has tapered off by now. \par \par 6/19/2020: Tyo presents for follow up, he reports no problems at all today, feeling well. \par CBC today reveals no major abnormalities. \par He reports that [facility], he is due for [facility] 3rd dose but no [facility] COVID19 pandemic, I will request for non- formulary vaccine and if able to obtain we will administer. \par \par 12/16/2020: Toy presents for follow up, he reports no problems at all today, feeling well. \par CBC today reveals HGB/ HCT  16.5/47.3 stable Platelet count is 195, 000 no major abnormalities. \par He completed Hept B/A vaccination series.\par Patient will remain on observation.  He is up to date with age appropriate screening. \par \par 6/23/2021: Toy is here for follow up, he reports he was fully vaccinated. CBC from 6/16/2021 was reviewed, platelet count remains normal, no other abnormalities noted. \par Outside lab CBC did not report at platelet count 2/2 clumping.

## 2021-06-23 NOTE — PHYSICAL EXAM
[Fully active, able to carry on all pre-disease performance without restriction] : Status 0 - Fully active, able to carry on all pre-disease performance without restriction [Normal] : no JVD, no calf tenderness, venous stasis changes, varices [de-identified] : + palpable lymph node superior to right clavicle

## 2021-06-30 DIAGNOSIS — R89.8 OTHER ABNORMAL FINDINGS IN SPECIMENS FROM OTHER ORGANS, SYSTEMS AND TISSUES: ICD-10-CM

## 2021-06-30 DIAGNOSIS — D69.6 THROMBOCYTOPENIA, UNSPECIFIED: ICD-10-CM

## 2021-12-02 ENCOUNTER — APPOINTMENT (OUTPATIENT)
Age: 71
End: 2021-12-02

## 2022-06-01 ENCOUNTER — APPOINTMENT (OUTPATIENT)
Age: 72
End: 2022-06-01
Payer: MEDICARE

## 2022-06-01 VITALS
HEART RATE: 72 BPM | BODY MASS INDEX: 33.03 KG/M2 | RESPIRATION RATE: 12 BRPM | WEIGHT: 223 LBS | SYSTOLIC BLOOD PRESSURE: 120 MMHG | OXYGEN SATURATION: 97 % | DIASTOLIC BLOOD PRESSURE: 70 MMHG | HEIGHT: 69 IN

## 2022-06-01 DIAGNOSIS — G47.33 OBSTRUCTIVE SLEEP APNEA (ADULT) (PEDIATRIC): ICD-10-CM

## 2022-06-01 PROCEDURE — 99214 OFFICE O/P EST MOD 30 MIN: CPT

## 2022-06-01 NOTE — REASON FOR VISIT
[Follow-Up] : a follow-up visit [Sleep Apnea] : sleep apnea [Obesity] : obesity [TextBox_44] : Doing well not having any issues.  He has a ResMed machine at home and was not affected by the recall

## 2022-06-16 ENCOUNTER — NON-APPOINTMENT (OUTPATIENT)
Age: 72
End: 2022-06-16

## 2022-06-21 ENCOUNTER — NON-APPOINTMENT (OUTPATIENT)
Age: 72
End: 2022-06-21

## 2022-06-29 ENCOUNTER — NON-APPOINTMENT (OUTPATIENT)
Age: 72
End: 2022-06-29

## 2022-07-02 ENCOUNTER — NON-APPOINTMENT (OUTPATIENT)
Age: 72
End: 2022-07-02

## 2022-07-22 ENCOUNTER — OUTPATIENT (OUTPATIENT)
Dept: OUTPATIENT SERVICES | Facility: HOSPITAL | Age: 72
LOS: 1 days | Discharge: HOME | End: 2022-07-22

## 2022-07-22 DIAGNOSIS — R07.9 CHEST PAIN, UNSPECIFIED: ICD-10-CM

## 2022-07-22 PROCEDURE — 71046 X-RAY EXAM CHEST 2 VIEWS: CPT | Mod: 26

## 2022-07-26 ENCOUNTER — NON-APPOINTMENT (OUTPATIENT)
Age: 72
End: 2022-07-26

## 2022-11-02 ENCOUNTER — NON-APPOINTMENT (OUTPATIENT)
Age: 72
End: 2022-11-02

## 2023-03-01 ENCOUNTER — APPOINTMENT (OUTPATIENT)
Dept: UROLOGY | Facility: CLINIC | Age: 73
End: 2023-03-01
Payer: MEDICARE

## 2023-03-01 VITALS — HEIGHT: 69 IN | WEIGHT: 228 LBS | BODY MASS INDEX: 33.77 KG/M2

## 2023-03-01 DIAGNOSIS — C61 MALIGNANT NEOPLASM OF PROSTATE: ICD-10-CM

## 2023-03-01 PROCEDURE — 99204 OFFICE O/P NEW MOD 45 MIN: CPT

## 2023-03-01 NOTE — HISTORY OF PRESENT ILLNESS
[FreeTextEntry1] : 71 yo with prostate cancer\par s/p RALP 2007 at [facility] (Dr. Leija)\par \par pathology not provided\par believes he was a 3+3 not certain the Margin Status\par \par he is for the most part continent\par offers no complaints regarding erections\par \par 2/2023\par PSA - 0.11 ng/ml\par UA - no RBCS\par \par  [None] : no symptoms

## 2023-03-01 NOTE — ASSESSMENT
[FreeTextEntry1] : 71 yo with prostate cancer\par prior records not provided\par \par PSA 0.11 ng/ml\par no imaging of kidneys\par urine reviewed\par \par - renal and bladder US\par - PSA in 6 months\par \par all questions answered

## 2023-03-01 NOTE — LETTER BODY
[Dear  ___] : Dear  [unfilled], [Consult Letter:] : I had the pleasure of evaluating your patient, [unfilled]. [Please see my note below.] : Please see my note below. [Sincerely,] : Sincerely, [FreeTextEntry3] : Anna Mejia MD, FACS\par

## 2023-03-15 ENCOUNTER — NON-APPOINTMENT (OUTPATIENT)
Age: 73
End: 2023-03-15

## 2023-03-16 ENCOUNTER — NON-APPOINTMENT (OUTPATIENT)
Age: 73
End: 2023-03-16

## 2023-04-07 ENCOUNTER — OUTPATIENT (OUTPATIENT)
Dept: OUTPATIENT SERVICES | Facility: HOSPITAL | Age: 73
LOS: 1 days | End: 2023-04-07
Payer: MEDICARE

## 2023-04-07 DIAGNOSIS — Z00.8 ENCOUNTER FOR OTHER GENERAL EXAMINATION: ICD-10-CM

## 2023-04-07 DIAGNOSIS — I25.119 ATHEROSCLEROTIC HEART DISEASE OF NATIVE CORONARY ARTERY WITH UNSPECIFIED ANGINA PECTORIS: ICD-10-CM

## 2023-04-07 DIAGNOSIS — I25.84 CORONARY ATHEROSCLEROSIS DUE TO CALCIFIED CORONARY LESION: ICD-10-CM

## 2023-04-07 PROCEDURE — 75571 CT HRT W/O DYE W/CA TEST: CPT

## 2023-04-07 PROCEDURE — 75571 CT HRT W/O DYE W/CA TEST: CPT | Mod: 26

## 2023-04-08 DIAGNOSIS — I25.84 CORONARY ATHEROSCLEROSIS DUE TO CALCIFIED CORONARY LESION: ICD-10-CM

## 2023-06-12 ENCOUNTER — APPOINTMENT (OUTPATIENT)
Dept: PULMONOLOGY | Facility: CLINIC | Age: 73
End: 2023-06-12
Payer: MEDICARE

## 2023-06-12 VITALS
RESPIRATION RATE: 14 BRPM | SYSTOLIC BLOOD PRESSURE: 114 MMHG | HEIGHT: 69 IN | DIASTOLIC BLOOD PRESSURE: 78 MMHG | BODY MASS INDEX: 32.88 KG/M2 | HEART RATE: 79 BPM | OXYGEN SATURATION: 97 % | WEIGHT: 222 LBS

## 2023-06-12 PROCEDURE — 99213 OFFICE O/P EST LOW 20 MIN: CPT

## 2023-06-12 NOTE — REASON FOR VISIT
[Sleep Apnea] : sleep apnea [Obesity] : obesity [TextBox_44] : History of sleep apnea doing very well with therapy not having any issues

## 2023-09-25 ENCOUNTER — APPOINTMENT (OUTPATIENT)
Dept: UROLOGY | Facility: CLINIC | Age: 73
End: 2023-09-25
Payer: MEDICARE

## 2023-09-25 DIAGNOSIS — N28.1 CYST OF KIDNEY, ACQUIRED: ICD-10-CM

## 2023-09-25 PROCEDURE — 99214 OFFICE O/P EST MOD 30 MIN: CPT

## 2023-09-26 ENCOUNTER — OUTPATIENT (OUTPATIENT)
Dept: OUTPATIENT SERVICES | Facility: HOSPITAL | Age: 73
LOS: 1 days | End: 2023-09-26
Payer: MEDICARE

## 2023-09-26 DIAGNOSIS — R16.0 HEPATOMEGALY, NOT ELSEWHERE CLASSIFIED: ICD-10-CM

## 2023-09-26 DIAGNOSIS — Z00.8 ENCOUNTER FOR OTHER GENERAL EXAMINATION: ICD-10-CM

## 2023-09-26 PROCEDURE — A9579: CPT

## 2023-09-26 PROCEDURE — 74183 MRI ABD W/O CNTR FLWD CNTR: CPT

## 2023-09-26 PROCEDURE — 74183 MRI ABD W/O CNTR FLWD CNTR: CPT | Mod: 26,MH

## 2023-09-27 DIAGNOSIS — R16.0 HEPATOMEGALY, NOT ELSEWHERE CLASSIFIED: ICD-10-CM

## 2023-10-01 PROBLEM — N28.1 SIMPLE CYST OF KIDNEY: Status: ACTIVE | Noted: 2023-10-01

## 2023-10-03 LAB
APPEARANCE: CLEAR
BILIRUBIN URINE: NEGATIVE
BLOOD URINE: NEGATIVE
COLOR: YELLOW
GLUCOSE QUALITATIVE U: NEGATIVE MG/DL
KETONES URINE: NEGATIVE MG/DL
LEUKOCYTE ESTERASE URINE: NEGATIVE
NITRITE URINE: NEGATIVE
PH URINE: 7.5
PROTEIN URINE: NEGATIVE MG/DL
PSA, POST - PROSTATECTOMY: 0.15 NG/ML
SPECIFIC GRAVITY URINE: 1.02
UROBILINOGEN URINE: 0.2 MG/DL

## 2024-03-28 ENCOUNTER — APPOINTMENT (OUTPATIENT)
Dept: UROLOGY | Facility: CLINIC | Age: 74
End: 2024-03-28
Payer: MEDICARE

## 2024-03-28 DIAGNOSIS — C61 MALIGNANT NEOPLASM OF PROSTATE: ICD-10-CM

## 2024-03-28 PROCEDURE — 99214 OFFICE O/P EST MOD 30 MIN: CPT

## 2024-03-28 NOTE — ASSESSMENT
[FreeTextEntry1] : 75 yo with prostate cancer s/p RALP 2007 at [facility] (Dr. Leija)  pathology not provided believes he was a 3+3 not certain the Margin Status  he is for the most part continent he does have erectile dysfunction but is not requesting any treatment for this - he can masturbate to orgasm but not "please a woman"  PSA 3/2024 0.15 ng/ml 9/2023 0.15 ng/ml 2/2023 0.11 ng/ml    Radiology Services - US 3/2023 normal study pre-void 274 / post 16 LEFT simple renal cyst (3.3 x 3.3 x 2.6cm) no hydronephrosis or masses LIVER cysts and hypoechoic lesion   has had multiple gout attacks started on allopurinol CPAP has improved nocturia  Plan  75 yo with prostate cancer PSA 0.15 ng/ml - stable gout episode required started allopurinol CPA has improved urination and nocturia - renal and bladder US ordered (prior reviewed) to look for stones - PSA reviewed (stability reinforced) will repeat in 6 months all questions answered

## 2024-03-28 NOTE — PHYSICAL EXAM
[General Appearance - Well Nourished] : well nourished [General Appearance - Well Developed] : well developed [Well Groomed] : well groomed [Normal Appearance] : normal appearance [Abdomen Soft] : soft [General Appearance - In No Acute Distress] : no acute distress [Abdomen Tenderness] : non-tender [Costovertebral Angle Tenderness] : no ~M costovertebral angle tenderness [Edema] : no peripheral edema [] : no respiratory distress [Respiration, Rhythm And Depth] : normal respiratory rhythm and effort [Exaggerated Use Of Accessory Muscles For Inspiration] : no accessory muscle use [Oriented To Time, Place, And Person] : oriented to person, place, and time [Affect] : the affect was normal [Mood] : the mood was normal [Not Anxious] : not anxious [Normal Station and Gait] : the gait and station were normal for the patient's age [No Focal Deficits] : no focal deficits [No Palpable Adenopathy] : no palpable adenopathy

## 2024-03-28 NOTE — HISTORY OF PRESENT ILLNESS
[FreeTextEntry1] : 73 yo with prostate cancer s/p RALP 2007 at [facility] (Dr. Leija)  pathology not provided believes he was a 3+3 not certain the Margin Status  he is for the most part continent he does have erectile dysfunction but is not requesting any treatment for this - he can masturbate to orgasm but not "please a woman"  PSA 3/2024 0.15 ng/ml 9/2023 0.15 ng/ml 2/2023 0.11 ng/ml    Radiology Services - US 3/2023 normal study pre-void 274 / post 16 LEFT simple renal cyst (3.3 x 3.3 x 2.6cm) no hydronephrosis or masses LIVER cysts and hypoechoic lesion   has had multiple gout attacks started on allopurinol CPAP has improved nocturia       [Urinary Retention] : no urinary retention [Urinary Urgency] : no urinary urgency [Urinary Frequency] : no urinary frequency [Nocturia] : nocturia [Straining] : no straining [Weak Stream] : no weak stream [Intermittency] : no intermittency [Erectile Dysfunction] : Erectile Dysfunction

## 2024-05-02 ENCOUNTER — APPOINTMENT (OUTPATIENT)
Dept: RHEUMATOLOGY | Facility: CLINIC | Age: 74
End: 2024-05-02

## 2024-06-10 ENCOUNTER — APPOINTMENT (OUTPATIENT)
Dept: PULMONOLOGY | Facility: CLINIC | Age: 74
End: 2024-06-10
Payer: MEDICARE

## 2024-06-10 VITALS
DIASTOLIC BLOOD PRESSURE: 70 MMHG | WEIGHT: 227 LBS | HEIGHT: 69 IN | SYSTOLIC BLOOD PRESSURE: 108 MMHG | OXYGEN SATURATION: 96 % | HEART RATE: 82 BPM | BODY MASS INDEX: 33.62 KG/M2

## 2024-06-10 DIAGNOSIS — G47.33 OBSTRUCTIVE SLEEP APNEA (ADULT) (PEDIATRIC): ICD-10-CM

## 2024-06-10 DIAGNOSIS — E66.9 OBESITY, UNSPECIFIED: ICD-10-CM

## 2024-06-10 PROCEDURE — 99213 OFFICE O/P EST LOW 20 MIN: CPT

## 2024-06-10 PROCEDURE — G2211 COMPLEX E/M VISIT ADD ON: CPT

## 2024-06-10 RX ORDER — ALLOPURINOL 200 MG/1
TABLET ORAL
Refills: 0 | Status: ACTIVE | COMMUNITY

## 2024-06-10 NOTE — REASON FOR VISIT
[Follow-Up] : a follow-up visit [Sleep Apnea] : sleep apnea [TextBox_44] : Doing well in general very compliant with his machine.  [facility]

## 2024-06-10 NOTE — ASSESSMENT
[FreeTextEntry1] :  The patient is benefitting from the CPAP. New supplies Weight loss Stress the need maintain compliance  with the CPAP. F/U in 12 months

## 2024-09-05 ENCOUNTER — OUTPATIENT (OUTPATIENT)
Dept: OUTPATIENT SERVICES | Facility: HOSPITAL | Age: 74
LOS: 1 days | End: 2024-09-05
Payer: MEDICARE

## 2024-09-05 ENCOUNTER — RESULT REVIEW (OUTPATIENT)
Age: 74
End: 2024-09-05

## 2024-09-05 DIAGNOSIS — N20.0 CALCULUS OF KIDNEY: ICD-10-CM

## 2024-09-05 DIAGNOSIS — Z00.8 ENCOUNTER FOR OTHER GENERAL EXAMINATION: ICD-10-CM

## 2024-09-05 PROCEDURE — 76770 US EXAM ABDO BACK WALL COMP: CPT | Mod: 26

## 2024-09-05 PROCEDURE — 76770 US EXAM ABDO BACK WALL COMP: CPT

## 2024-09-06 DIAGNOSIS — N20.0 CALCULUS OF KIDNEY: ICD-10-CM

## 2024-09-29 ENCOUNTER — NON-APPOINTMENT (OUTPATIENT)
Age: 74
End: 2024-09-29

## 2024-09-30 ENCOUNTER — APPOINTMENT (OUTPATIENT)
Dept: UROLOGY | Facility: CLINIC | Age: 74
End: 2024-09-30
Payer: MEDICARE

## 2024-09-30 DIAGNOSIS — C61 MALIGNANT NEOPLASM OF PROSTATE: ICD-10-CM

## 2024-09-30 PROCEDURE — 99214 OFFICE O/P EST MOD 30 MIN: CPT

## 2024-09-30 NOTE — ASSESSMENT
[FreeTextEntry1] : 75 yo with prostate cancer s/p RALP 2007 at [facility] (Dr. Leija)  pathology not provided believes he was a 3+3 not certain the Margin Status  he is for the most part continent he does have erectile dysfunction but is not requesting any treatment for this - he can masturbate to orgasm but not "please a woman"  PSA 9/2024 0.21 ng/ml  3/2024 0.15 ng/ml 9/2023 0.15 ng/ml 2/2023 0.11 ng/ml    9/2024 - Renal and Bladder US FINDINGS: Right kidney: 11.4 cm. No renal mass, hydronephrosis or calculi. Left kidney: 11.1 cm. No renal mass, hydronephrosis or calculi. Parapelvic cyst. Urinary bladder: Bilateral ureteral jets. No debris or calculi. Prevoid volume of 465 cc. Postvoid volume of 11 cc. Prostate: Status post prostatectomy.  IMPRESSION: No hydronephrosis or calculi visualized.  Radiology Services - US 3/2023 normal study pre-void 274 / post 16 LEFT simple renal cyst (3.3 x 3.3 x 2.6cm) no hydronephrosis or masses LIVER cysts and hypoechoic lesion   has had multiple gout attacks started on allopurinol CPAP has improved nocturia   Plan  75 yo with prostate cancer PSA 0.21 ng/ml - has been staedily rising  gout episode required started allopurinol  - renal and bladder US reviewed - PSA reviewed - PSMA to assess for cancer recurrence  - f/u in 6 weeks to re-assess

## 2024-11-07 ENCOUNTER — APPOINTMENT (OUTPATIENT)
Dept: UROLOGY | Facility: CLINIC | Age: 74
End: 2024-11-07
Payer: MEDICARE

## 2024-11-07 VITALS
BODY MASS INDEX: 31.84 KG/M2 | SYSTOLIC BLOOD PRESSURE: 116 MMHG | HEIGHT: 69 IN | DIASTOLIC BLOOD PRESSURE: 75 MMHG | HEART RATE: 80 BPM | WEIGHT: 215 LBS | OXYGEN SATURATION: 96 % | RESPIRATION RATE: 18 BRPM

## 2024-11-07 DIAGNOSIS — C61 MALIGNANT NEOPLASM OF PROSTATE: ICD-10-CM

## 2024-11-07 PROCEDURE — 99214 OFFICE O/P EST MOD 30 MIN: CPT | Mod: 1L

## 2025-02-07 ENCOUNTER — NON-APPOINTMENT (OUTPATIENT)
Age: 75
End: 2025-02-07

## 2025-05-08 ENCOUNTER — APPOINTMENT (OUTPATIENT)
Dept: UROLOGY | Facility: CLINIC | Age: 75
End: 2025-05-08
Payer: MEDICARE

## 2025-05-08 VITALS
HEART RATE: 80 BPM | OXYGEN SATURATION: 96 % | BODY MASS INDEX: 31.84 KG/M2 | DIASTOLIC BLOOD PRESSURE: 79 MMHG | WEIGHT: 215 LBS | HEIGHT: 69 IN | SYSTOLIC BLOOD PRESSURE: 122 MMHG

## 2025-05-08 PROBLEM — R97.20 ELEVATED PROSTATE SPECIFIC ANTIGEN (PSA): Status: ACTIVE | Noted: 2025-05-08

## 2025-05-08 LAB
BILIRUB UR QL STRIP: NORMAL
COLLECTION METHOD: NORMAL
GLUCOSE UR-MCNC: NORMAL
HCG UR QL: 0.2 EU/DL
HGB UR QL STRIP.AUTO: NORMAL
KETONES UR-MCNC: NORMAL
LEUKOCYTE ESTERASE UR QL STRIP: NORMAL
NITRITE UR QL STRIP: NORMAL
PH UR STRIP: 5.5
PROT UR STRIP-MCNC: NORMAL
SP GR UR STRIP: 1

## 2025-05-08 PROCEDURE — 81003 URINALYSIS AUTO W/O SCOPE: CPT | Mod: QW

## 2025-05-08 PROCEDURE — 99214 OFFICE O/P EST MOD 30 MIN: CPT

## 2025-06-09 ENCOUNTER — APPOINTMENT (OUTPATIENT)
Dept: PULMONOLOGY | Facility: CLINIC | Age: 75
End: 2025-06-09
Payer: MEDICARE

## 2025-06-09 ENCOUNTER — APPOINTMENT (OUTPATIENT)
Dept: PULMONOLOGY | Facility: CLINIC | Age: 75
End: 2025-06-09

## 2025-06-09 VITALS
OXYGEN SATURATION: 97 % | HEIGHT: 69 IN | HEART RATE: 76 BPM | RESPIRATION RATE: 14 BRPM | SYSTOLIC BLOOD PRESSURE: 108 MMHG | WEIGHT: 216 LBS | BODY MASS INDEX: 31.99 KG/M2 | DIASTOLIC BLOOD PRESSURE: 64 MMHG

## 2025-06-09 PROCEDURE — G2211 COMPLEX E/M VISIT ADD ON: CPT

## 2025-06-09 PROCEDURE — 99213 OFFICE O/P EST LOW 20 MIN: CPT
